# Patient Record
Sex: FEMALE | NOT HISPANIC OR LATINO | Employment: OTHER | URBAN - METROPOLITAN AREA
[De-identification: names, ages, dates, MRNs, and addresses within clinical notes are randomized per-mention and may not be internally consistent; named-entity substitution may affect disease eponyms.]

---

## 2017-04-03 ENCOUNTER — HOSPITAL ENCOUNTER (OUTPATIENT)
Dept: MAMMOGRAPHY | Facility: CLINIC | Age: 42
Discharge: HOME | End: 2017-04-03
Attending: OBSTETRICS & GYNECOLOGY | Admitting: OBSTETRICS & GYNECOLOGY
Payer: COMMERCIAL

## 2017-04-03 DIAGNOSIS — Z12.31 VISIT FOR SCREENING MAMMOGRAM: ICD-10-CM

## 2017-04-03 PROCEDURE — G0202 SCR MAMMO BI INCL CAD: HCPCS

## 2017-04-18 ENCOUNTER — OFFICE VISIT (OUTPATIENT)
Dept: FAMILY MEDICINE | Facility: CLINIC | Age: 42
End: 2017-04-18
Payer: COMMERCIAL

## 2017-04-18 VITALS
SYSTOLIC BLOOD PRESSURE: 128 MMHG | TEMPERATURE: 97.8 F | HEIGHT: 65 IN | OXYGEN SATURATION: 95 % | DIASTOLIC BLOOD PRESSURE: 84 MMHG | WEIGHT: 227 LBS | BODY MASS INDEX: 37.82 KG/M2 | HEART RATE: 80 BPM

## 2017-04-18 DIAGNOSIS — Z13.6 CARDIOVASCULAR SCREENING; LDL GOAL LESS THAN 160: ICD-10-CM

## 2017-04-18 DIAGNOSIS — R03.0 ELEVATED BLOOD PRESSURE READING WITHOUT DIAGNOSIS OF HYPERTENSION: Primary | ICD-10-CM

## 2017-04-18 DIAGNOSIS — E66.9 OBESITY (BMI 30-39.9): ICD-10-CM

## 2017-04-18 PROCEDURE — 99214 OFFICE O/P EST MOD 30 MIN: CPT | Performed by: PHYSICIAN ASSISTANT

## 2017-04-18 NOTE — PATIENT INSTRUCTIONS
So glad you're motivated to make life-style changes.  After BP log review do not feel meds required at this time.  Certainly still at risk for HTN development though in future should this not improve.  Discussed secondary causes including dietary salt and obesity.  Will screen for additional risk factors with lipids and diabetes.  RTC for fasting labs.  Would also advise consult with nutritionist for formal weight loss help.  Re-check in 3 months.    Electronically Signed By: Kristin Kumar PA-C

## 2017-04-18 NOTE — NURSING NOTE
"Chief Complaint   Patient presents with     Hypertension       Initial BP (!) 142/106 (BP Location: Right arm, Cuff Size: Adult Large)  Pulse 80  Temp 97.8  F (36.6  C) (Oral)  Ht 5' 5\" (1.651 m)  Wt 227 lb (103 kg)  SpO2 95%  Breastfeeding? No  BMI 37.77 kg/m2 Estimated body mass index is 37.77 kg/(m^2) as calculated from the following:    Height as of this encounter: 5' 5\" (1.651 m).    Weight as of this encounter: 227 lb (103 kg).  Medication Reconciliation: complete    "

## 2017-04-18 NOTE — PROGRESS NOTES
SUBJECTIVE:                                                    Jacinta Robertson is a 42 year old female who presents to clinic today for the following health issues:      Hypertension - Family history of heart related issues.  Mother  of heart attack in early 70s - she was a smoker and mitral valve problems.    - Patient was seen for her physical at her OB Clinic in [place] and her blood pressure has been consistently high there. Higher than typical of even what she had in the past.   Reports typically runs in 130-140's systolic. Was even higher that time and she impressed upon pt the importance of starting to manage her health/weight better and take care of her health.  Pt in agreement and wished to establish care in FP to ensure on-going follow-up regarding this.    Happy to report over the past 1 month and has been starting to make changes with this.  Started exercising M-F 30 min daily.   Decreasing sugar/processed carb intake. More protein. Remote hx of seeing a nutritionist, but does agree this would be beneficial again as she knows she struggles with portion control.   Motivated to make these changes and would like to avoid meds if she can, but wanted to be sure med management not required.    Bought home cuff and has been checking 2-3x daily and brings log for review. Of the total 78 readings only 24 were > 140/90.  Rare reading in 160s/100s - admits she didn't always rest appropriately before taking, but tried not to be active or avoid checking if sick or hurt.    Admits she is more anxious and knows this can play a role too.  Stressors recently with new home and recent move.   Non-smoker.  No other CV risk screening excluding her OB did check a TSH which she shows me today and this was normal.      Outpatient blood pressures are being checked at home for about a month 2-3 times a day  Results are .    Low Salt Diet: no added salt       Amount of exercise or physical activity: None    Problems taking  "medications regularly: isn't on a blood pressure medication currently    Medication side effects: none    Diet: has been cutting out carbs as well as sugars      Problem list and histories reviewed & adjusted, as indicated.  Additional history: as documented    Patient Active Problem List   Diagnosis     CARDIOVASCULAR SCREENING; LDL GOAL LESS THAN 160     Obesity (BMI 30-39.9)     Migraines     Past Surgical History:   Procedure Laterality Date     BUNIONECTOMY  2003    bilateral     C ORAL SURGERY PROCEDURE      wisdom teeth       Social History   Substance Use Topics     Smoking status: Never Smoker     Smokeless tobacco: Never Used     Alcohol use 0.0 oz/week     0 Standard drinks or equivalent per week     Family History   Problem Relation Age of Onset     Hypertension Mother       age 71     Lipids Father      Breast Cancer Paternal Aunt      Cousin as well     C.A.D. Mother          Current Outpatient Prescriptions   Medication Sig Dispense Refill     multivitamin, therapeutic with minerals (THERA-VIT-M) TABS        loratadine (CLARITIN) 10 MG tablet Take 10 mg by mouth       triamcinolone (NASACORT AQ) 55 MCG/ACT nasal inhaler Spray 2 sprays into both nostrils daily. 3 Inhaler 3     azelastine (OPTIVAR) 0.05 % SOLN Apply 1 drop to eye 2 times daily. 1 Bottle 3     No Known Allergies    Reviewed and updated as needed this visit by clinical staff  Tobacco  Allergies  Meds  Med Hx  Surg Hx  Fam Hx  Soc Hx      Reviewed and updated as needed this visit by Provider  Tobacco  Allergies  Meds  Med Hx  Surg Hx  Fam Hx  Soc Hx        ROS:  Constitutional, HEENT, cardiovascular, pulmonary, gi and gu systems are negative, except as otherwise noted.    OBJECTIVE:                                                    /84  Pulse 80  Temp 97.8  F (36.6  C) (Oral)  Ht 5' 5\" (1.651 m)  Wt 227 lb (103 kg)  SpO2 95%  Breastfeeding? No  BMI 37.77 kg/m2  Body mass index is 37.77 kg/(m^2).   Pt home " BP cuff checked against ours in clinic and found to be within 2-3mmHg.  GENERAL: pleasant obese female. NAD.  Psych: affect bright, engaged. Normal speech, thought content and mentation.  EYES: Eyes grossly normal to inspection, PERRL and conjunctivae and sclerae normal  NECK: no adenopathy, no asymmetry, masses, or scars and thyroid normal to palpation  RESP: lungs clear to auscultation - no rales, rhonchi or wheezes  CV: regular rate and rhythm, normal S1 S2, no S3 or S4, no murmur, click or rub, no peripheral edema and peripheral pulses strong    Diagnostic Test Results:  none      ASSESSMENT/PLAN:                                                          ICD-10-CM    1. Elevated blood pressure reading without diagnosis of hypertension R03.0 Comprehensive metabolic panel (BMP + Alb, Alk Phos, ALT, AST, Total. Bili, TP)     NUTRITION REFERRAL   2. Obesity (BMI 30-39.9) E66.9 NUTRITION REFERRAL   3. CARDIOVASCULAR SCREENING; LDL GOAL LESS THAN 160 Z13.6 Lipid Profile with reflex to direct LDL   Reviewed with pt that suspect her BP elevation is primarily secondary due to poor dietary habits and obesity.  Pt in agreement and very motivated to make changes.  To her credit she has already started weight loss routine and has been checking pressures at home on own.  We reviewed appropriate way to check this moving forward for continue monitoring as she continues life-style changes.   Advised RTC for further CV risk screening and would likely benefit from continued support with nutritionist.  New referral placed.  Re-check weight and BP log review again in 3 months.  Pt in agreement with plan.  See Patient Instructions  A total of 30 minutes was spent with the patient today, with greater than 50% of the visit involving counseling and coordination of care regarding potential risks of untreated HTN, possible underlying causes of HTN and next best treatment plan as noted above and in pt instructions.  If persistent BP  elevation despite weight normalization would consider additional work-up with EKG, CXR, sleep study if indicated.  Patient Instructions   So glad you're motivated to make life-style changes.  After BP log review do not feel meds required at this time.  Certainly still at risk for HTN development though in future should this not improve.  Discussed secondary causes including dietary salt and obesity.  Will screen for additional risk factors with lipids and diabetes.  RTC for fasting labs.  Would also advise consult with nutritionist for formal weight loss help.  Re-check in 3 months.    Electronically Signed By: Kristin Kumar PA-C

## 2017-04-18 NOTE — MR AVS SNAPSHOT
After Visit Summary   4/18/2017    Jacinta Robertson    MRN: [id number]           Patient Information     Date Of Birth          1975        Visit Information        Provider Department      4/18/2017 11:20 AM Kristin Kumar PA-C [facility]        Today's Diagnoses     Elevated blood pressure reading without diagnosis of hypertension    -  1    Obesity (BMI 30-39.9)        CARDIOVASCULAR SCREENING; LDL GOAL LESS THAN 160          Care Instructions    So glad you're motivated to make life-style changes.  After BP log review do not feel meds required at this time.  Certainly still at risk for HTN development though in future should this not improve.  Discussed secondary causes including dietary salt and obesity.  Will screen for additional risk factors with lipids and diabetes.  RTC for fasting labs.  Would also advise consult with nutritionist for formal weight loss help.  Re-check in 3 months.    Electronically Signed By: Kristin Kumar PA-C          Follow-ups after your visit        Additional Services     NUTRITION REFERRAL       Your provider has referred you to: [facility]: [facility] [phone]   [email or web address]  [facility]: [facility] [phone]   [email or web address]  [facility]: [place] [facility] [phone]   [email or web address]  [facility]: [facility] [place] [phone]   [email or web address]    Please be aware that coverage of these services is subject to the terms and limitations of your health insurance plan.  Call member services at your health plan with any benefit or coverage questions.      Please bring the following with you to your appointment:    (1) This referral request  (2) Any documents given to you regarding this referral  (3) Any specific questions you have about diet  "and/or food choices                  Future tests that were ordered for you today     Open Future Orders        Priority Expected Expires Ordered    Lipid Profile with reflex to direct LDL Routine  5/18/2017 4/18/2017    Comprehensive metabolic panel (BMP + Alb, Alk Phos, ALT, AST, Total. Bili, TP) Routine  5/18/2017 4/18/2017            Who to contact     If you have questions or need follow up information about today's clinic visit or your schedule please contact [facility] directly at [phone].  Normal or non-critical lab and imaging results will be communicated to you by [employer], letter or phone within 4 business days after the clinic has received the results. If you do not hear from us within 7 days, please contact the clinic through [employer] or phone. If you have a critical or abnormal lab result, we will notify you by phone as soon as possible.  Submit refill requests through [facility] or call your pharmacy and they will forward the refill request to us. Please allow 3 business days for your refill to be completed.          Additional Information About Your Visit        [employer] Information     [facility] gives you secure access to your electronic health record. If you see a primary care provider, you can also send messages to your care team and make appointments. If you have questions, please call your primary care clinic.  If you do not have a primary care provider, please call [phone] and they will assist you.        Care EveryWhere ID     This is your Care EveryWhere ID. This could be used by other organizations to access your [facility] medical records  [id number]        Your Vitals Were     Pulse Temperature Height Pulse Oximetry Breastfeeding? BMI (Body Mass Index)    80 97.8  F (36.6  C) (Oral) 5' 5\" (1.651 m) 95% No 37.77 kg/m2       Blood Pressure from Last 3 Encounters:   04/18/17 128/84   01/12/16 118/80   04/17/14 112/80    Weight from Last 3 Encounters:   04/18/17 227 lb (103 kg) "   01/12/16 212 lb (96.2 kg)   04/17/14 212 lb (96.2 kg)              We Performed the Following     NUTRITION REFERRAL        Primary Care Provider Office Phone # Fax #    Kristin Kumar PA-C [phone]       [facility] [address] [facility] [address]        Thank you!     Thank you for choosing [facility]  for your care. Our goal is always to provide you with excellent care. Hearing back from our patients is one way we can continue to improve our services. Please take a few minutes to complete the written survey that you may receive in the mail after your visit with us. Thank you!             Your Updated Medication List - Protect others around you: Learn how to safely use, store and throw away your medicines at www.disposemymeds.org.          This list is accurate as of: 4/18/17 12:18 PM.  Always use your most recent med list.                   Brand Name Dispense Instructions for use    azelastine 0.05 % Soln ophthalmic solution    OPTIVAR    1 Bottle    Apply 1 drop to eye 2 times daily.       loratadine 10 MG tablet    CLARITIN     Take 10 mg by mouth       multivitamin, therapeutic with minerals Tabs tablet          triamcinolone 55 MCG/ACT nasal inhaler    NASACORT AQ    3 Inhaler    Spray 2 sprays into both nostrils daily.

## 2017-04-22 DIAGNOSIS — R03.0 ELEVATED BLOOD PRESSURE READING WITHOUT DIAGNOSIS OF HYPERTENSION: ICD-10-CM

## 2017-04-22 DIAGNOSIS — Z13.6 CARDIOVASCULAR SCREENING; LDL GOAL LESS THAN 160: ICD-10-CM

## 2017-04-22 LAB
ALBUMIN SERPL-MCNC: 3.7 G/DL (ref 3.4–5)
ALP SERPL-CCNC: 77 U/L (ref 40–150)
ALT SERPL W P-5'-P-CCNC: 37 U/L (ref 0–50)
ANION GAP SERPL CALCULATED.3IONS-SCNC: 9 MMOL/L (ref 3–14)
AST SERPL W P-5'-P-CCNC: 18 U/L (ref 0–45)
BILIRUB SERPL-MCNC: 0.6 MG/DL (ref 0.2–1.3)
BUN SERPL-MCNC: 13 MG/DL (ref 7–30)
CALCIUM SERPL-MCNC: 8.9 MG/DL (ref 8.5–10.1)
CHLORIDE SERPL-SCNC: 105 MMOL/L (ref 94–109)
CHOLEST SERPL-MCNC: 209 MG/DL
CO2 SERPL-SCNC: 26 MMOL/L (ref 20–32)
CREAT SERPL-MCNC: 0.74 MG/DL (ref 0.52–1.04)
GFR SERPL CREATININE-BSD FRML MDRD: 85 ML/MIN/1.7M2
GLUCOSE SERPL-MCNC: 85 MG/DL (ref 70–99)
HDLC SERPL-MCNC: 45 MG/DL
LDLC SERPL CALC-MCNC: 139 MG/DL
NONHDLC SERPL-MCNC: 164 MG/DL
POTASSIUM SERPL-SCNC: 4.3 MMOL/L (ref 3.4–5.3)
PROT SERPL-MCNC: 7.8 G/DL (ref 6.8–8.8)
SODIUM SERPL-SCNC: 140 MMOL/L (ref 133–144)
TRIGL SERPL-MCNC: 124 MG/DL

## 2017-04-22 PROCEDURE — 36415 COLL VENOUS BLD VENIPUNCTURE: CPT | Performed by: PHYSICIAN ASSISTANT

## 2017-04-22 PROCEDURE — 80061 LIPID PANEL: CPT | Performed by: PHYSICIAN ASSISTANT

## 2017-04-22 PROCEDURE — 80053 COMPREHEN METABOLIC PANEL: CPT | Performed by: PHYSICIAN ASSISTANT

## 2017-04-22 NOTE — LETTER
[facility]          [address] [address]                                           [phone]  April 28, 2017     Jacinta Robertson  [address]  [place] [address]      Dear Jacinta,    The results of your recent tests were reviewed and are as follows:    -Liver and gallbladder tests are normal. (ALT,AST, Alk phos, bilirubin), kidney function is normal (Cr, GFR), Sodium is normal, Potassium is normal, Calcium is normal, Glucose is normal (diabetes screening test).   -LDL(bad) cholesterol is elevated and trigylceride levels are normal.   -HDL(good) cholesterol level is low which can increase your heart disease risk.  A diet high in fat and simple carbohydrates, genetics and being overweight can contribute to this.   ADVISE: a regular exercise program with at least 30 minutes of aerobic exercise 3-4 days/week ( 45 minutes 4-6 days/week if weight loss needed) are helpful to improve this. Continue your life-style changes as planned! Rechecking your cholesterol in 6-12 months is recommended (LIPID w/ LDL reflex, DX: low HDL).     Enclosed is a copy of the results.     Thank you for choosing [facility].  We appreciate the opportunity to serve you and look forward to supporting your healthcare needs in the future.    If you have any questions or concerns, please call me or my staff at [phone].      Sincerely,    MILADYS Rice

## 2017-04-28 NOTE — PROGRESS NOTES
Please call or write patient with the following results:    -Liver and gallbladder tests are normal. (ALT,AST, Alk phos, bilirubin), kidney function is normal (Cr, GFR), Sodium is normal, Potassium is normal, Calcium is normal, Glucose is normal (diabetes screening test).   -LDL(bad) cholesterol is elevated and trigylceride levels are normal.  -HDL(good) cholesterol level is low which can increase your heart disease risk.  A diet high in fat and simple carbohydrates, genetics and being overweight can contribute to this.   ADVISE: a regular exercise program with at least 30 minutes of aerobic exercise 3-4 days/week ( 45 minutes 4-6 days/week if weight loss needed) are helpful to improve this. Continue your life-style changes as planned! Rechecking your cholesterol in 6-12 months is recommended (LIPID w/ LDL reflex, DX: low HDL).    Electronically Signed By: Kristin Kumar PA-C

## 2017-07-03 ENCOUNTER — OFFICE VISIT (OUTPATIENT)
Dept: FAMILY MEDICINE | Facility: CLINIC | Age: 42
End: 2017-07-03
Payer: COMMERCIAL

## 2017-07-03 VITALS
HEART RATE: 83 BPM | DIASTOLIC BLOOD PRESSURE: 85 MMHG | WEIGHT: 225 LBS | OXYGEN SATURATION: 98 % | HEIGHT: 65 IN | BODY MASS INDEX: 37.49 KG/M2 | SYSTOLIC BLOOD PRESSURE: 124 MMHG | TEMPERATURE: 98.5 F

## 2017-07-03 DIAGNOSIS — E66.9 OBESITY (BMI 30-39.9): ICD-10-CM

## 2017-07-03 DIAGNOSIS — R03.0 ELEVATED BP WITHOUT DIAGNOSIS OF HYPERTENSION: Primary | ICD-10-CM

## 2017-07-03 PROCEDURE — 99213 OFFICE O/P EST LOW 20 MIN: CPT | Performed by: PHYSICIAN ASSISTANT

## 2017-07-03 NOTE — PROGRESS NOTES
"  SUBJECTIVE:                                                    Jacinta Robertson is a 42 year old female who presents to clinic today for the following health issues:      Hypertension Follow-up; see prior note for details. Has made significant strides with making changes in exercise and diet.   Had one hiccup where she had a head cold mid May so this put her back a bit as took her about 3 weeks to be feeling well again.   Otherwise had been exercising 30 min 5x per day.  Now since feeling better and with busy summer plans is doing between 3-5x per week.  Walks dog and doing a lot of yard work with more physical activity this way too.   See nutritionist at [facility].  Given recommendations regarding limiting sugar and \"healing my gut\" had a lot of abx as a child for ear infections.  They reviewed with her gut inflammation and how this contributes to immune health.   Taking probiotic before meals.   Trying to have \"healthy fats\" and more vegetables.   Still has sugar at moments, but not \"out of control\" like she was previously.   Has been slow changes, but steady.   Has been noticing she is less bloated and overall feels better.  Focusing on life-style changes.  Meeting at end of the month again.   Had suffered with plantar fasciitis and notice that even things like this even bother her less with making good changes.       Brings copy of BP log with 9 readings.   Only 3 out of 9 are elevated in diastolic range at most of 99.  All systolic readings are normal.       Outpatient blood pressures are being checked at home.  Results are .    Low Salt Diet: no added salt      Amount of exercise or physical activity: 3-5 days a week    Problems taking medications regularly: No    Medication side effects: none    Diet: regular (no restrictions) - seeing a nutritionists and is eating no processed foods        Problem list and histories reviewed & adjusted, as indicated.  Additional history: as " "documented    Patient Active Problem List   Diagnosis     CARDIOVASCULAR SCREENING; LDL GOAL LESS THAN 160     Obesity (BMI 30-39.9)     Migraines     Past Surgical History:   Procedure Laterality Date     BUNIONECTOMY  2003    bilateral     C ORAL SURGERY PROCEDURE      wisdom teeth     ORTHOPEDIC SURGERY  2003    bilateral bunionectomy       Social History   Substance Use Topics     Smoking status: Never Smoker     Smokeless tobacco: Never Used     Alcohol use 0.0 oz/week     Family History   Problem Relation Age of Onset     Hypertension Mother       age 71     C.A.D. Mother      Lipids Father      Hyperlipidemia Father      Breast Cancer Paternal Aunt      Cousin as well         Current Outpatient Prescriptions   Medication Sig Dispense Refill     multivitamin, therapeutic with minerals (THERA-VIT-M) TABS        loratadine (CLARITIN) 10 MG tablet Take 10 mg by mouth       triamcinolone (NASACORT AQ) 55 MCG/ACT nasal inhaler Spray 2 sprays into both nostrils daily. 3 Inhaler 3     azelastine (OPTIVAR) 0.05 % SOLN Apply 1 drop to eye 2 times daily. 1 Bottle 3     No Known Allergies    Reviewed and updated as needed this visit by clinical staff       Reviewed and updated as needed this visit by Provider           ROS:  Constitutional, HEENT, cardiovascular, pulmonary systems are negative, except as otherwise noted.    OBJECTIVE:     /85  Pulse 83  Temp 98.5  F (36.9  C) (Oral)  Ht 5' 5\" (1.651 m)  Wt 225 lb (102.1 kg)  SpO2 98%  Breastfeeding? No  BMI 37.44 kg/m2  Body mass index is 37.44 kg/(m^2).  GENERAL: healthy, alert and no distress  No further exam completed    Diagnostic Test Results:  none     ASSESSMENT/PLAN:       ICD-10-CM    1. Elevated BP without diagnosis of hypertension R03.0    2. Obesity (BMI 30-39.9) E66.9    All readings improved from previous.  Do not feel that medication for HTN warranted at this time as pt loosing weight, making health life-style changes and overall has " had BP improvement.  Will continue efforts and we'll check in again in 6 months.  Pt to see her OB/GYN for routine female health as well.  See Patient Instructions  Patient Instructions   Great work! Keep it up!  BP's continue to show improvement.  Re-check in 6 months unless sooner if noticing persistent BP increasing.    Electronically Signed By: Kristin Kumar PA-C

## 2017-07-03 NOTE — NURSING NOTE
"Chief Complaint   Patient presents with     Hypertension       Initial /85  Pulse 83  Temp 98.5  F (36.9  C) (Oral)  Ht 5' 5\" (1.651 m)  Wt 225 lb (102.1 kg)  SpO2 98%  Breastfeeding? No  BMI 37.44 kg/m2 Estimated body mass index is 37.44 kg/(m^2) as calculated from the following:    Height as of this encounter: 5' 5\" (1.651 m).    Weight as of this encounter: 225 lb (102.1 kg).  Medication Reconciliation: complete    "

## 2017-07-03 NOTE — PATIENT INSTRUCTIONS
Great work! Keep it up!  BP's continue to show improvement.  Re-check in 6 months unless sooner if noticing persistent BP increasing.    Electronically Signed By: Kristin Kumar PA-C

## 2017-07-03 NOTE — MR AVS SNAPSHOT
After Visit Summary   7/3/2017    Jacinta Robertson    MRN: [id number]           Patient Information     Date Of Birth          1975        Visit Information        Provider Department      7/3/2017 11:00 AM Kristin Kumar PA-C [facility] [place]        Today's Diagnoses     Elevated BP without diagnosis of hypertension    -  1    Obesity (BMI 30-39.9)          Care Instructions    Great work! Keep it up!  BP's continue to show improvement.  Re-check in 6 months unless sooner if noticing persistent BP increasing.    Electronically Signed By: Kristin Kumar PA-C            Follow-ups after your visit        Who to contact     If you have questions or need follow up information about today's clinic visit or your schedule please contact [facility] [place] directly at [phone].  Normal or non-critical lab and imaging results will be communicated to you by TrackIFhart, letter or phone within 4 business days after the clinic has received the results. If you do not hear from us within 7 days, please contact the clinic through TrackIFhart or phone. If you have a critical or abnormal lab result, we will notify you by phone as soon as possible.  Submit refill requests through Sketchfab or call your pharmacy and they will forward the refill request to us. Please allow 3 business days for your refill to be completed.          Additional Information About Your Visit        MyChart Information     Sketchfab gives you secure access to your electronic health record. If you see a primary care provider, you can also send messages to your care team and make appointments. If you have questions, please call your primary care clinic.  If you do not have a primary care provider, please call [phone] and they will assist you.        Care EveryWhere ID     This is your Care EveryWhere ID. This could be used by other organizations to access your [place] medical records  [id number]        Your  "Vitals Were     Pulse Temperature Height Pulse Oximetry Breastfeeding? BMI (Body Mass Index)    83 98.5  F (36.9  C) (Oral) 5' 5\" (1.651 m) 98% No 37.44 kg/m2       Blood Pressure from Last 3 Encounters:   07/03/17 124/85   04/18/17 128/84   01/12/16 118/80    Weight from Last 3 Encounters:   07/03/17 225 lb (102.1 kg)   04/18/17 227 lb (103 kg)   01/12/16 212 lb (96.2 kg)              Today, you had the following     No orders found for display       Primary Care Provider Office Phone # Fax #    Kristin Kumar PA-C [phone]       [facility] [address] [facility] [address]        Equal Access to Services     HUBER ATKINS : Hadii aad ku hadasho Soomaali, waaxda luqadaha, qaybta kaalmada adeegyada, patria sweeneyin hayaan marian belle . So [facility] [phone].    ATENCIÓN: Si habla español, tiene a kasper disposición servicios gratuitos de asistencia lingüística. Cheyanne al [phone].    We comply with applicable federal civil rights laws and [place] laws. We do not discriminate on the basis of race, color, national origin, age, disability sex, sexual orientation or gender identity.            Thank you!     Thank you for choosing [facility]  for your care. Our goal is always to provide you with excellent care. Hearing back from our patients is one way we can continue to improve our services. Please take a few minutes to complete the written survey that you may receive in the mail after your visit with us. Thank you!             Your Updated Medication List - Protect others around you: Learn how to safely use, store and throw away your medicines at www.disposemymeds.org.          This list is accurate as of: 7/3/17 11:44 AM.  Always use your most recent med list.                   Brand Name Dispense Instructions for use Diagnosis    azelastine 0.05 % Soln ophthalmic solution    OPTIVAR    1 Bottle    Apply 1 drop to eye 2 times daily.    Seasonal allergies    "    loratadine 10 MG tablet    CLARITIN     Take 10 mg by mouth        multivitamin, therapeutic with minerals Tabs tablet           triamcinolone 55 MCG/ACT nasal inhaler    NASACORT AQ    3 Inhaler    Spray 2 sprays into both nostrils daily.    Seasonal allergies

## 2017-09-16 ENCOUNTER — NURSE TRIAGE (OUTPATIENT)
Dept: NURSING | Facility: CLINIC | Age: 42
End: 2017-09-16

## 2017-09-16 ENCOUNTER — OFFICE VISIT (OUTPATIENT)
Dept: URGENT CARE | Facility: URGENT CARE | Age: 42
End: 2017-09-16
Payer: COMMERCIAL

## 2017-09-16 VITALS
SYSTOLIC BLOOD PRESSURE: 135 MMHG | DIASTOLIC BLOOD PRESSURE: 87 MMHG | HEART RATE: 79 BPM | OXYGEN SATURATION: 96 % | TEMPERATURE: 98.7 F

## 2017-09-16 DIAGNOSIS — L03.114 CELLULITIS OF LEFT UPPER EXTREMITY: Primary | ICD-10-CM

## 2017-09-16 PROCEDURE — 99213 OFFICE O/P EST LOW 20 MIN: CPT | Performed by: INTERNAL MEDICINE

## 2017-09-16 RX ORDER — SULFAMETHOXAZOLE/TRIMETHOPRIM 800-160 MG
1 TABLET ORAL 2 TIMES DAILY
Qty: 20 TABLET | Refills: 0 | Status: SHIPPED | OUTPATIENT
Start: 2017-09-16 | End: 2018-12-31

## 2017-09-16 NOTE — TELEPHONE ENCOUNTER
"Slept on pillow with zipper to elbow.  Awoke Thursday with elbow red, \"hot\" and swollen.  Temp had been \"102\" which has now resolved to \"99\" at last check.  Not worsening, but area not improving.        Reason for Disposition    [1] Redness of the skin AND [2] no fever    Protocols used: ELBOW SWELLING-ADULT-AH    "

## 2017-09-16 NOTE — PROGRESS NOTES
SUBJECTIVE:  Jacinta Robertson is an 42 year old female who presents for swelling and redness of left elbow.  Woke up two days ago and noticed pain in the elbow.  Felt a little swelling and tenderness at that time.  Then later in day started to feel feverish.  Temp was 100.2 when checked it.  Then yesterday the redness seemed to be spreading.  No fever yesterday and felt more normal overall. Today seems be spreading more and is tender to touch and warm to touch.  No fever today.  No bites, stings or scratches she's aware of in that area.  No n/v/d.  No cough or runny nose.  No recent intl travel but  recently was in [place] and is feeling fine.   No known exposures.  Did take an OTC zinc med and applied a cream to the affected area.         has a past medical history of major depression; Hypertension; SHAILA (obstructive sleep apnea); and Seasonal allergies.  ALLERGIES:  Review of patient's allergies indicates no known allergies.    Current Outpatient Prescriptions   Medication     UNABLE TO FIND     UNABLE TO FIND     Docosahexaenoic Acid (DHA PO)     Cholecalciferol (VITAMIN D-3 PO)     Borage, Borago officinalis, (BORAGE OIL PO)     Probiotic Product (PROBIOTIC DAILY PO)     loratadine (CLARITIN) 10 MG tablet     triamcinolone (NASACORT AQ) 55 MCG/ACT nasal inhaler     multivitamin, therapeutic with minerals (THERA-VIT-M) TABS     azelastine (OPTIVAR) 0.05 % SOLN     No current facility-administered medications for this visit.          ROS:  ROS is done and is negative for general/constitutional, eye, ENT, Respiratory, cardiovascular, GI, , Skin, musculoskeletal except as noted elsewhere.      OBJECTIVE:  /87 (BP Location: Right arm, Cuff Size: Adult Large)  Pulse 79  Temp 98.7  F (37.1  C) (Oral)  SpO2 96%  Breastfeeding? No  GENERAL APPEARANCE: Alert, in no acute distress  EYES: normal  NOSE: normal  OROPHARYNX:normal  NECK:No adenopathy,masses or thyromegaly  RESP: normal and clear to  auscultation  CV:regular rate and rhythm and no murmurs, clicks, or gallops  ABDOMEN: Abdomen soft, non-tender. BS normal. No masses, organomegaly  Left arm: posterior elbow region with mild diffuse edema covering area approx 6x7 inches with area just superior to elbow with moderate edema.  Area is moderately erythematous, very tender to touch, and moderately warm to touch.   No pain with rom.      RECENT LAB RESULTS  .    ASSESSMENT/PLAN:    ASSESSMENT / PLAN:  (L03.114) Cellulitis of left upper extremity  (primary encounter diagnosis)  Comment:   Plan: sulfamethoxazole-trimethoprim (BACTRIM         DS/SEPTRA DS) 800-160 MG per tablet        Reviewed medication instructions and side effects. Follow up if experiences side effects.. I reviewed supportive care, expected course, and signs of concern.  Follow up prn or if she does not improve or if worsens in any way.      See [facility] for orders, medications, letters, patient instructions    Annette Garcia M.D.

## 2017-09-16 NOTE — MR AVS SNAPSHOT
After Visit Summary   9/16/2017    Jacinta Robertson    MRN: [id number]           Patient Information     Date Of Birth          1975        Visit Information        Provider Department      9/16/2017 10:20 AM Annette Garcia MD [facility]        Today's Diagnoses     Cellulitis of left upper extremity    -  1       Follow-ups after your visit        Follow-up notes from your care team     Return if symptoms worsen or fail to improve.      Who to contact     If you have questions or need follow up information about today's clinic visit or your schedule please contact [facility] directly at [phone].  Normal or non-critical lab and imaging results will be communicated to you by MyChart, letter or phone within 4 business days after the clinic has received the results. If you do not hear from us within 7 days, please contact the clinic through 5th Planet Gameshart or phone. If you have a critical or abnormal lab result, we will notify you by phone as soon as possible.  Submit refill requests through Micropoint Technologies or call your pharmacy and they will forward the refill request to us. Please allow 3 business days for your refill to be completed.          Additional Information About Your Visit        MyChart Information     [employer] gives you secure access to your electronic health record. If you see a primary care provider, you can also send messages to your care team and make appointments. If you have questions, please call your primary care clinic.  If you do not have a primary care provider, please call [phone] and they will assist you.        Care EveryWhere ID     This is your Care EveryWhere ID. This could be used by other organizations to access your [place] medical records  [id number]        Your Vitals Were     Pulse Temperature Pulse Oximetry Breastfeeding?          79 98.7  F (37.1  C) (Oral) 96% No         Blood Pressure from Last 3 Encounters:    09/16/17 135/87   07/03/17 124/85   04/18/17 128/84    Weight from Last 3 Encounters:   07/03/17 225 lb (102.1 kg)   04/18/17 227 lb (103 kg)   01/12/16 212 lb (96.2 kg)              Today, you had the following     No orders found for display         Today's Medication Changes          These changes are accurate as of: 9/16/17 10:50 AM.  If you have any questions, ask your nurse or doctor.               Start taking these medicines.        Dose/Directions    sulfamethoxazole-trimethoprim 800-160 MG per tablet   Commonly known as:  BACTRIM DS/SEPTRA DS   Used for:  Cellulitis of left upper extremity   Started by:  Annette Garcia MD        Dose:  1 tablet   Take 1 tablet by mouth 2 times daily   Quantity:  20 tablet   Refills:  0            Where to get your medicines      These medications were sent to [facility] [address] [place] [address] AT [facility] [address] [address], [facility] [address]    Hours:  24-hours Phone:  [phone]     sulfamethoxazole-trimethoprim 800-160 MG per tablet                Primary Care Provider Office Phone # Fax #    Kristin Kumar PA-C [phone] [address] [facility] [address]        Equal Access to Services     [facility] AH: Hadii aad ku hadasho Soomaali, waaxda luqadaha, qaybta kaalmada adeegyada, patria cabello. So [facility] [phone].    ATENCIÓN: Si habla español, tiene a kasper disposición servicios gratuitos de asistencia lingüística. Cheyanne al [phone].    We comply with applicable federal civil rights laws and [place] laws. We do not discriminate on the basis of race, color, national origin, age, disability sex, sexual orientation or gender identity.            Thank you!     Thank you for choosing [facility]  for your care. Our goal is always to provide you with excellent care. Hearing back from our patients is one way we can continue  to improve our services. Please take a few minutes to complete the written survey that you may receive in the mail after your visit with us. Thank you!             Your Updated Medication List - Protect others around you: Learn how to safely use, store and throw away your medicines at www.disposemymeds.org.          This list is accurate as of: 9/16/17 10:50 AM.  Always use your most recent med list.                   Brand Name Dispense Instructions for use Diagnosis    azelastine 0.05 % Soln ophthalmic solution    OPTIVAR    1 Bottle    Apply 1 drop to eye 2 times daily.    Seasonal allergies       BORAGE OIL PO           DHA PO           loratadine 10 MG tablet    CLARITIN     Take 10 mg by mouth        multivitamin, therapeutic with minerals Tabs tablet           PROBIOTIC DAILY PO           sulfamethoxazole-trimethoprim 800-160 MG per tablet    BACTRIM DS/SEPTRA DS    20 tablet    Take 1 tablet by mouth 2 times daily    Cellulitis of left upper extremity       triamcinolone 55 MCG/ACT nasal inhaler    NASACORT AQ    3 Inhaler    Spray 2 sprays into both nostrils daily.    Seasonal allergies       * UNABLE TO FIND      MEDICATION NAME: OTC allergy eye drop        * UNABLE TO FIND      MEDICATION NAME: Grape fruit seed extract        VITAMIN D-3 PO           * Notice:  This list has 2 medication(s) that are the same as other medications prescribed for you. Read the directions carefully, and ask your doctor or other care provider to review them with you.

## 2017-09-16 NOTE — NURSING NOTE
"Chief Complaint   Patient presents with     Urgent Care     Elbow left     Left elbow pain started Thursday morning--patient is unsure why, patient state that there has been no injury. Elbow has been swollen and hot to the touch--may have been a bug bite but no itching.        Initial /87 (BP Location: Right arm, Cuff Size: Adult Large)  Pulse 79  Temp 98.7  F (37.1  C) (Oral)  SpO2 96%  Breastfeeding? No Estimated body mass index is 37.44 kg/(m^2) as calculated from the following:    Height as of 7/3/17: 5' 5\" (1.651 m).    Weight as of 7/3/17: 225 lb (102.1 kg).  Medication Reconciliation: complete.  JANAE Padgett      "

## 2017-09-26 ENCOUNTER — OFFICE VISIT (OUTPATIENT)
Dept: FAMILY MEDICINE | Facility: CLINIC | Age: 42
End: 2017-09-26
Payer: COMMERCIAL

## 2017-09-26 ENCOUNTER — TELEPHONE (OUTPATIENT)
Dept: FAMILY MEDICINE | Facility: CLINIC | Age: 42
End: 2017-09-26

## 2017-09-26 VITALS
WEIGHT: 218 LBS | HEIGHT: 65 IN | SYSTOLIC BLOOD PRESSURE: 124 MMHG | OXYGEN SATURATION: 98 % | TEMPERATURE: 98.5 F | DIASTOLIC BLOOD PRESSURE: 78 MMHG | BODY MASS INDEX: 36.32 KG/M2 | HEART RATE: 93 BPM

## 2017-09-26 DIAGNOSIS — L03.114 CELLULITIS OF LEFT ELBOW: Primary | ICD-10-CM

## 2017-09-26 PROCEDURE — 99213 OFFICE O/P EST LOW 20 MIN: CPT | Performed by: PHYSICIAN ASSISTANT

## 2017-09-26 NOTE — MR AVS SNAPSHOT
"              After Visit Summary   9/26/2017    Jacinta Robertson    MRN: [id number]           Patient Information     Date Of Birth          1975        Visit Information        Provider Department      9/26/2017 11:20 AM Calli Miguel PA-C [facility] [place]        Today's Diagnoses     Cellulitis of left elbow    -  1       Follow-ups after your visit        Who to contact     If you have questions or need follow up information about today's clinic visit or your schedule please contact [facility] [place] directly at [phone].  Normal or non-critical lab and imaging results will be communicated to you by Autopilot (formerly Bislr)hart, letter or phone within 4 business days after the clinic has received the results. If you do not hear from us within 7 days, please contact the clinic through [email or web address] or phone. If you have a critical or abnormal lab result, we will notify you by phone as soon as possible.  Submit refill requests through 4Cable TV or call your pharmacy and they will forward the refill request to us. Please allow 3 business days for your refill to be completed.          Additional Information About Your Visit        MyChart Information     4Cable TV gives you secure access to your electronic health record. If you see a primary care provider, you can also send messages to your care team and make appointments. If you have questions, please call your primary care clinic.  If you do not have a primary care provider, please call [phone] and they will assist you.        Care EveryWhere ID     This is your Care EveryWhere ID. This could be used by other organizations to access your [place] medical records  [id number]        Your Vitals Were     Pulse Temperature Height Pulse Oximetry BMI (Body Mass Index)       93 98.5  F (36.9  C) (Oral) 5' 5\" (1.651 m) 98% 36.28 kg/m2        Blood Pressure from Last 3 Encounters:   09/26/17 124/78   09/16/17 135/87   07/03/17 124/85    Weight from Last 3 Encounters: "   09/26/17 218 lb (98.9 kg)   07/03/17 225 lb (102.1 kg)   04/18/17 227 lb (103 kg)              Today, you had the following     No orders found for display       Primary Care Provider Office Phone # Fax #    Kristin Kumar PA-C [phone] [address] [facility] [address]        Equal Access to Services     FRAN ATKINS : Hadii aad ku hadasho Soomaali, waaxda luqadaha, qaybta kaalmada adeegyada, waxay idiin hayaan adeeg khtawnyash lajalen . So [facility] [phone].    ATENCIÓN: Si harjeetla espchacho, tiene a kasper disposición servicios gratuitos de asistencia lingüística. Teaame al [phone].    We comply with applicable federal civil rights laws and [place] laws. We do not discriminate on the basis of race, color, national origin, age, disability sex, sexual orientation or gender identity.            Thank you!     Thank you for choosing [facility]  for your care. Our goal is always to provide you with excellent care. Hearing back from our patients is one way we can continue to improve our services. Please take a few minutes to complete the written survey that you may receive in the mail after your visit with us. Thank you!             Your Updated Medication List - Protect others around you: Learn how to safely use, store and throw away your medicines at www.disposemymeds.org.          This list is accurate as of: 9/26/17  1:40 PM.  Always use your most recent med list.                   Brand Name Dispense Instructions for use Diagnosis    azelastine 0.05 % Soln ophthalmic solution    OPTIVAR    1 Bottle    Apply 1 drop to eye 2 times daily.    Seasonal allergies       BORAGE OIL PO           DHA PO           loratadine 10 MG tablet    CLARITIN     Take 10 mg by mouth        multivitamin, therapeutic with minerals Tabs tablet           PROBIOTIC DAILY PO           sulfamethoxazole-trimethoprim 800-160 MG per tablet    BACTRIM DS/SEPTRA DS    20 tablet    Take 1 tablet by mouth  2 times daily    Cellulitis of left upper extremity       triamcinolone 55 MCG/ACT nasal inhaler    NASACORT AQ    3 Inhaler    Spray 2 sprays into both nostrils daily.    Seasonal allergies       * UNABLE TO FIND      MEDICATION NAME: OTC allergy eye drop        * UNABLE TO FIND      MEDICATION NAME: Grape fruit seed extract        VITAMIN D-3 PO           * Notice:  This list has 2 medication(s) that are the same as other medications prescribed for you. Read the directions carefully, and ask your doctor or other care provider to review them with you.

## 2017-09-26 NOTE — TELEPHONE ENCOUNTER
Patient calling. She was seen for cellulitis of the elbow was placed on antibiotics and completed Bactrim this morning.     She states that she still has a little bump on the elbow, with a little bit of tenderness and appears bruised. She is wondering if she needs a longer course of antibiotics, or if she needs to be seen again.    Advised that we should see her for follow up. Appointment scheduled with JUDITH TOMAS for today at 11:20am.    Patient verbalized understanding and agrees with plan.    Will call back if further questions or concerns.    Tabby Rodriguez, RN, BSN

## 2017-09-26 NOTE — PROGRESS NOTES
SUBJECTIVE:   Jacinta Robertson is a 42 year old female who presents to clinic today for the following health issues:    ED/UC Followup:    Facility:  [facility]  Date of visit: 17  Reason for visit: Cellulitis of Left Elbow  Current Status: Pt took her last Bactrim today that was prescribed at , still has a bump is not sure if it needs to be drained or needs to try a different antibiotic.  The swelling has gone down but only in certain spots it is  but not painful like it was before.      Symptoms have significantly improved.   No longer having pain  ROM is good  No additional fevers  Has completed course of antibiotic  Swelling has significantly decreased, as has redness    Feels like there may be some fluid there still    Had been resting her elbow on hard surface prior to onset of symptoms     Problem list and histories reviewed & adjusted, as indicated.  Additional history: as documented    Patient Active Problem List   Diagnosis     CARDIOVASCULAR SCREENING; LDL GOAL LESS THAN 160     Obesity (BMI 30-39.9)     Migraines     Past Surgical History:   Procedure Laterality Date     BUNIONECTOMY  2003    bilateral     C ORAL SURGERY PROCEDURE      wisdom teeth     ORTHOPEDIC SURGERY  2003    bilateral bunionectomy       Social History   Substance Use Topics     Smoking status: Never Smoker     Smokeless tobacco: Never Used     Alcohol use 0.0 oz/week     Family History   Problem Relation Age of Onset     Hypertension Mother       age 71     C.A.D. Mother      Lipids Father      Hyperlipidemia Father      Breast Cancer Paternal Aunt      Cousin as well         Current Outpatient Prescriptions   Medication Sig Dispense Refill     UNABLE TO FIND MEDICATION NAME: OTC allergy eye drop       UNABLE TO FIND MEDICATION NAME: Grape fruit seed extract       Docosahexaenoic Acid (DHA PO)        Cholecalciferol (VITAMIN D-3 PO)        Borage, Borago officinalis, (BORAGE OIL PO)     "    Probiotic Product (PROBIOTIC DAILY PO)        multivitamin, therapeutic with minerals (THERA-VIT-M) TABS        loratadine (CLARITIN) 10 MG tablet Take 10 mg by mouth       triamcinolone (NASACORT AQ) 55 MCG/ACT nasal inhaler Spray 2 sprays into both nostrils daily. 3 Inhaler 3     azelastine (OPTIVAR) 0.05 % SOLN Apply 1 drop to eye 2 times daily. 1 Bottle 3     sulfamethoxazole-trimethoprim (BACTRIM DS/SEPTRA DS) 800-160 MG per tablet Take 1 tablet by mouth 2 times daily (Patient not taking: Reported on 9/26/2017) 20 tablet 0     No Known Allergies      Reviewed and updated as needed this visit by clinical staff       Reviewed and updated as needed this visit by Provider         ROS:  CONSTITUTIONAL:NEGATIVE  for chills and fever  INTEGUMENTARY/SKIN: NEGATIVE for redness and swelling  MUSCULOSKELETAL: POSITIVE  for very mild L elbow tenderness  NEURO: NEGATIVE for numbness or tingling    OBJECTIVE:     /78 (BP Location: Right arm, Patient Position: Sitting, Cuff Size: Adult Regular)  Pulse 93  Temp 98.5  F (36.9  C) (Oral)  Ht 5' 5\" (1.651 m)  Wt 218 lb (98.9 kg)  SpO2 98%  BMI 36.28 kg/m2  Body mass index is 36.28 kg/(m^2).  GENERAL: healthy, alert and no distress  RESP: lungs clear to auscultation - no rales, rhonchi or wheezes  CV: regular rate and rhythm, normal S1 S2, no S3 or S4, no murmur, click or rub, no peripheral edema and peripheral pulses strong  MS: No tenderness to palpation of L elbow. Small effusion overlying olecranon.  SKIN: Nickel-sized area of faint erythema overlying olecranon. Mild desquamation in this region    Diagnostic Test Results:  none     ASSESSMENT/PLAN:     1. Cellulitis of left elbow  Symptoms have significantly improved. Has completed antibiotic. No evidence of ongoing infection. May have residual effusion, but no evidence of septic joint. Recommend icing and ibuprofen to help with residual inflammation. Follow-up if symptoms worsen again.     Calli Miguel, " JUDITH  [facility] [place]

## 2017-09-26 NOTE — NURSING NOTE
"Chief Complaint   Patient presents with     Urgent Care f/u       Initial /78 (BP Location: Right arm, Patient Position: Sitting, Cuff Size: Adult Regular)  Pulse 93  Temp 98.5  F (36.9  C) (Oral)  Ht 5' 5\" (1.651 m)  Wt 218 lb (98.9 kg)  SpO2 98%  BMI 36.28 kg/m2 Estimated body mass index is 36.28 kg/(m^2) as calculated from the following:    Height as of this encounter: 5' 5\" (1.651 m).    Weight as of this encounter: 218 lb (98.9 kg).  Medication Reconciliation: complete   Rosette Ervin MA    "

## 2018-04-10 ENCOUNTER — HOSPITAL ENCOUNTER (OUTPATIENT)
Dept: MAMMOGRAPHY | Facility: CLINIC | Age: 43
Discharge: HOME | End: 2018-04-10
Attending: OBSTETRICS & GYNECOLOGY | Admitting: OBSTETRICS & GYNECOLOGY
Payer: COMMERCIAL

## 2018-04-10 DIAGNOSIS — Z12.31 VISIT FOR SCREENING MAMMOGRAM: ICD-10-CM

## 2018-04-10 PROCEDURE — 77067 SCR MAMMO BI INCL CAD: CPT

## 2018-12-10 ENCOUNTER — OFFICE VISIT (OUTPATIENT)
Dept: FAMILY MEDICINE | Facility: CLINIC | Age: 43
End: 2018-12-10
Payer: COMMERCIAL

## 2018-12-10 VITALS — DIASTOLIC BLOOD PRESSURE: 84 MMHG | SYSTOLIC BLOOD PRESSURE: 128 MMHG

## 2018-12-10 DIAGNOSIS — Z11.4 ENCOUNTER FOR SCREENING FOR HIV: ICD-10-CM

## 2018-12-10 DIAGNOSIS — Z13.21 ENCOUNTER FOR VITAMIN DEFICIENCY SCREENING: ICD-10-CM

## 2018-12-10 DIAGNOSIS — Z23 NEED FOR INFLUENZA VACCINATION: ICD-10-CM

## 2018-12-10 DIAGNOSIS — E66.09 CLASS 1 OBESITY DUE TO EXCESS CALORIES WITHOUT SERIOUS COMORBIDITY WITH BODY MASS INDEX (BMI) OF 34.0 TO 34.9 IN ADULT: ICD-10-CM

## 2018-12-10 DIAGNOSIS — E66.811 CLASS 1 OBESITY DUE TO EXCESS CALORIES WITHOUT SERIOUS COMORBIDITY WITH BODY MASS INDEX (BMI) OF 34.0 TO 34.9 IN ADULT: ICD-10-CM

## 2018-12-10 DIAGNOSIS — L30.8 OTHER ECZEMA: Primary | ICD-10-CM

## 2018-12-10 DIAGNOSIS — Z13.6 CARDIOVASCULAR SCREENING; LDL GOAL LESS THAN 160: ICD-10-CM

## 2018-12-10 DIAGNOSIS — Z13.1 SCREENING FOR DIABETES MELLITUS: ICD-10-CM

## 2018-12-10 PROCEDURE — 99214 OFFICE O/P EST MOD 30 MIN: CPT | Performed by: PHYSICIAN ASSISTANT

## 2018-12-10 RX ORDER — TRIAMCINOLONE ACETONIDE 1 MG/G
OINTMENT TOPICAL 2 TIMES DAILY
Qty: 15 G | Refills: 0 | Status: SHIPPED | OUTPATIENT
Start: 2018-12-10 | End: 2019-12-10

## 2018-12-10 ASSESSMENT — MIFFLIN-ST. JEOR: SCORE: 1594.83

## 2018-12-10 NOTE — PROGRESS NOTES
"  SUBJECTIVE:   Jacinta Robertson is a 43 year old female who presents to clinic today for the following health issues:      1) Discuss skin issues - eczema. Patches on her hands and feet. Seems to start like a little bubble and then comes up. Eczema since childhood. In college had on her feet and hands where it would cause more \"peeling.\" Recently over the past 1 yr has flared up again and over the weekend will scale, split and then become swollen. Pharmacist advised to try \"exederm\" which she shows me is a 1% OTC hydrocortisone cream. Advised to seen by pharmacist in 1 week if not improving.   Denies hyperhydrosis.   Sochx:  at elementary school - uses a lot of sanitizers and this can be aggravating. Only works 3.5 hours.  Has baseline non-fragranced OTC lotion.  Recently tried aquaphor.  Shea butter has helped.       2) Continuing care with nutritionist - originally went and improved, but then stopped care for a bit and gained her weight back. Thus, got re-invested in her care and went back to the nutritionist.   [facility]. Philosophy is eating \"real food.\"   Last seen just before Thanksgiving. Has been closely watching her weight and BP. Cut out grains for 2 months and then recently cut out dairy as felt this could also be contributing to her eczema.   BP's usually running in 120-133 systolic and mid 80's diastolic.   BP machine uses new guideline of < 130/80 as threshold.   Overall BPs better than 1 yr ago. Had been in the 130-140s systolic then.  Has lost 23 lbs in the past 1 yr.   Harder during the holiday's, but is trying to maintain as best she can.   Exercise - started in August working out M-F. 30-45 min or cardio or weight lifting. Will do \"beach body on demand.\"  Main concern is doesn't want to be come diabetic and be able to stay off BP medication.       Problem list and histories reviewed & adjusted, as indicated.  Additional history: as documented    Patient Active " Problem List   Diagnosis     CARDIOVASCULAR SCREENING; LDL GOAL LESS THAN 160     Migraines     Class 1 obesity due to excess calories without serious comorbidity with body mass index (BMI) of 34.0 to 34.9 in adult     Past Surgical History:   Procedure Laterality Date     BUNIONECTOMY  2003    bilateral     C ORAL SURGERY PROCEDURE      wisdom teeth     ORTHOPEDIC SURGERY  2003    bilateral bunionectomy       Social History     Tobacco Use     Smoking status: Never Smoker     Smokeless tobacco: Never Used   Substance Use Topics     Alcohol use: Yes     Alcohol/week: 0.0 oz     Family History   Problem Relation Age of Onset     Hypertension Mother          age 71     C.A.D. Mother      Lipids Father      Hyperlipidemia Father      Breast Cancer Paternal Aunt         Cousin as well         Current Outpatient Medications   Medication Sig Dispense Refill     triamcinolone (KENALOG) 0.1 % external ointment Apply topically 2 times daily 15 g 0     azelastine (OPTIVAR) 0.05 % SOLN Apply 1 drop to eye 2 times daily. 1 Bottle 3     Borage, Borago officinalis, (BORAGE OIL PO)        Cholecalciferol (VITAMIN D-3 PO)        Docosahexaenoic Acid (DHA PO)        loratadine (CLARITIN) 10 MG tablet Take 10 mg by mouth       multivitamin, therapeutic with minerals (THERA-VIT-M) TABS        Probiotic Product (PROBIOTIC DAILY PO)        sulfamethoxazole-trimethoprim (BACTRIM DS/SEPTRA DS) 800-160 MG per tablet Take 1 tablet by mouth 2 times daily (Patient not taking: Reported on 2017) 20 tablet 0     triamcinolone (NASACORT AQ) 55 MCG/ACT nasal inhaler Spray 2 sprays into both nostrils daily. 3 Inhaler 3     UNABLE TO FIND MEDICATION NAME: OTC allergy eye drop       UNABLE TO FIND MEDICATION NAME: Grape fruit seed extract       No Known Allergies    Reviewed and updated as needed this visit by clinical staff  Tobacco  Allergies  Meds  Problems  Med Hx  Surg Hx  Fam Hx  Soc Hx        Reviewed and updated as needed  "this visit by Provider  Tobacco  Allergies  Meds  Problems  Med Hx  Surg Hx  Fam Hx  Soc Hx          ROS:  Constitutional, HEENT, cardiovascular, pulmonary, integumentary systems are negative, except as otherwise noted.    OBJECTIVE:     /84   Pulse (P) 82   Temp (P) 98.2  F (36.8  C) (Oral)   Ht (P) 1.651 m (5' 5\")   Wt (P) 93.9 kg (207 lb)   SpO2 (P) 98%   BMI (P) 34.45 kg/m    Body mass index is 34.45 kg/m  (pended).  GENERAL: healthy, alert and no distress  SKIN: pt shows me patches of erythema, scaling, excoriation and cracking primarily along L ulnar digit and R radial 4th digit. Also shows me her L medial arch starting out as small flesh-colored vesicles right under the skin.     Diagnostic Test Results:  none     ASSESSMENT/PLAN:       ICD-10-CM    1. Other eczema L30.8 SKIN CARE REFERRAL     triamcinolone (KENALOG) 0.1 % external ointment   2. Class 1 obesity due to excess calories without serious comorbidity with body mass index (BMI) of 34.0 to 34.9 in adult E66.09     Z68.34    3. CARDIOVASCULAR SCREENING; LDL GOAL LESS THAN 160 Z13.6 Lipid panel reflex to direct LDL Fasting   4. Screening for diabetes mellitus Z13.1 **Comprehensive metabolic panel FUTURE 2mo   5. Encounter for screening for HIV Z11.4 **HIV Antigen Antibody Combo FUTURE 2mo   6. Encounter for vitamin deficiency screening Z13.21 **Vitamin D Deficiency FUTURE 2mo   After I printed AVS, pt requested to have future labs placed to do in 1 month and see her OB/GYN for routine physical so told ok to have labs done through us.  Encouraged to continue weight loss efforts and seeing nutritionist as planned.  Got to update her diagnosis as BMI decreased from 37 down to 34 today!  See Patient Instructions regarding eczema plan.  Pt in agreement.  A total of 25 minutes was spent with the patient today, with greater than 50% of the visit involving counseling and coordination of care regarding that noted above and in pt " instructions.  Patient Instructions   Discussed potential chronicity/relapsing/remitting nature and importance of maintaining good baseline moisturizing routine, avoidance of chemical irritants.  Ok to increase potency of steroid to see if this helps as well.  Risks reviewed.  Will also place referral to skin clinic to help establish long-term treatment routine if not improving.     Kristin Culp PA-C  [facility]

## 2018-12-10 NOTE — PATIENT INSTRUCTIONS
Discussed potential chronicity/relapsing/remitting nature and importance of maintaining good baseline moisturizing routine, avoidance of chemical irritants.  Ok to increase potency of steroid to see if this helps as well.  Risks reviewed.  Will also place referral to skin clinic to help establish long-term treatment routine if not improving.

## 2018-12-31 ENCOUNTER — OFFICE VISIT (OUTPATIENT)
Dept: FAMILY MEDICINE | Facility: CLINIC | Age: 43
End: 2018-12-31
Payer: COMMERCIAL

## 2018-12-31 VITALS — DIASTOLIC BLOOD PRESSURE: 87 MMHG | SYSTOLIC BLOOD PRESSURE: 132 MMHG | HEART RATE: 76 BPM

## 2018-12-31 DIAGNOSIS — L30.9 ECZEMA, UNSPECIFIED TYPE: Primary | ICD-10-CM

## 2018-12-31 PROCEDURE — 99214 OFFICE O/P EST MOD 30 MIN: CPT | Performed by: FAMILY MEDICINE

## 2018-12-31 RX ORDER — BETAMETHASONE DIPROPIONATE 0.5 MG/G
OINTMENT, AUGMENTED TOPICAL 2 TIMES DAILY
Qty: 50 G | Refills: 1 | Status: SHIPPED | OUTPATIENT
Start: 2018-12-31 | End: 2019-12-31

## 2018-12-31 RX ORDER — CEPHALEXIN 500 MG/1
500 CAPSULE ORAL 2 TIMES DAILY
Qty: 28 CAPSULE | Refills: 0 | Status: SHIPPED | OUTPATIENT
Start: 2018-12-31 | End: 2019-03-25

## 2018-12-31 NOTE — LETTER
12/31/2018         RE: Jacinta Robertson  [address] [id number]        Dear Colleague,    [facility] you [facility] your [facility], [facility] Robertson[facility]. Please see a copy of my visit note below.    [facility] - PRIMARY CARE SKIN    CC: Eczema  SUBJECTIVE:   Jacinta Robertson is a(n) 43 year old female who presents to clinic today because of eczema that started years ago. Symptoms have flared over the last 3 months.    Areas of involvement: hands, left foot.    Associated symptoms: open cracks, itchiness.  Symptoms appear to be: worsening.  Aggravating factors: occupational exposure with frequent handwashing. Symptoms are worse during allergy season or with dry skin, such as when blowing her nose frequently with a cold.    Recent exposure history: none identified  Previous history of a similar rash: YES - in childhood most prominently on the face, on hands and feet since young adulthood.  Any other family members with similar symptoms: NO.    Products used: Shea butter moisturizer on hands. Oil free moisturizer with SPF used on the face. Sunleaf naturals bar soap used.     Therapies tried: triamcinolone 0.1% ointment used for 1 week. Exederm also used previously. She has tried to use gloves at work. Aquaphor previously.  She uses Claritin during allergy season.    Issue Two: A rash on the face has developed after a facial peel.  Issue Three: A spot on the lip.  Issue Four: Hairs are growing within a mole on the left jawline. This mole is occasionally intensely itchy.    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    Patient Active Problem List   Diagnosis     CARDIOVASCULAR SCREENING; LDL GOAL LESS  THAN 160     Migraines     Class 1 obesity due to excess calories without serious comorbidity with body mass index (BMI) of 34.0 to 34.9 in adult       Past Medical History:   Diagnosis Date     Hx of major depression      Hypertension      Obesity (BMI 30-39.9) 3/29/2013     SHAILA (obstructive sleep apnea)     CPAP     Seasonal allergies     Past Surgical History:   Procedure Laterality Date     BUNIONECTOMY  2003    bilateral     C ORAL SURGERY PROCEDURE      wisdom teeth     ORTHOPEDIC SURGERY  2003    bilateral bunionectomy      Social History     Tobacco Use     Smoking status: Never Smoker     Smokeless tobacco: Never Used   Substance Use Topics     Alcohol use: Yes     Alcohol/week: 0.0 oz     Drug use: No    Family History     Problem (# of Occurrences) Relation (Name,Age of Onset)    Breast Cancer (1) Paternal Aunt: Cousin as well    C.A.D. (1) Mother    Hyperlipidemia (1) Father    Hypertension (1) Mother:  age 71    Lipids (1) Father           Current Outpatient Medications   Medication Sig Dispense Refill     Borage, Borago officinalis, (BORAGE OIL PO)        Cholecalciferol (VITAMIN D-3 PO)        Docosahexaenoic Acid (DHA PO)        Probiotic Product (PROBIOTIC DAILY PO)        triamcinolone (NASACORT AQ) 55 MCG/ACT nasal inhaler Spray 2 sprays into both nostrils daily. 3 Inhaler 3     UNABLE TO FIND MEDICATION NAME: OTC allergy eye drop       loratadine (CLARITIN) 10 MG tablet Take 10 mg by mouth       triamcinolone (KENALOG) 0.1 % external ointment Apply topically 2 times daily (Patient not taking: Reported on 2018) 15 g 0     UNABLE TO FIND MEDICATION NAME: Grape fruit seed extract         No Known Allergies     INTEGUMENTARY/SKIN: POSITIVE for pruritis and rash  ROS: 14 point review of systems was negative except the symptoms listed above in the HPI.    This document serves as a record of the services and decisions personally performed and made by Halima Bundy MD and was created  by Luis Eduardo Boone, a trained medical scribe, based on personal observations and provider statements to the medical scribe.  December 31, 2018 11:14 AM   Luis Eduardo Boone    OBJECTIVE:   GENERAL: healthy, alert and no distress.  SKIN: Wei Skin Type - II.  Face, Hands and Feet examined. The dermatoscope was used to help evaluate pigmented lesions.  Skin Pertinent Findings:  Left foot, medial plantar surface: some light scaling and erythema    Hands: Erythema, scaling, and fissuring eruption on left index finger, right ring finger. Small, discrete papules on dorsa of fingers and hands.    Fingernails: Normal    Mid-lower lip: 3 mm in size hypopigmented scaly lesion.    No rash on elbows, knees.    Diagnostic Test Results:  none     ASSESSMENT:     Encounter Diagnosis   Name Primary?     Eczema, unspecified type Yes         PLAN:   Patient Instructions   DRY SKIN MANAGEMENT INSTRUCTIONS  Routine use of moisturizer is important for healthy, resilient skin not just for soft skin.     Apply moisturizer every time after washing your hands.    Sealing in moisture    Twice daily use of a moisturizer such as over-the-counter (OTC) CeraVe moisturizer cream (in the jar). CeraVe products contain ceramides and filaggrin proteins that can help to maintain the body's moisture layer.    After cleansing or washing, always apply moisturizer immediately after drying off (pat dry only) for best effect.    Protection while hydrating    Do not overuse soap. Unless you have been sweating extensively, just apply soap to groin and armpits.    Recommended products for body include: OTC unscented Dove for sensitive skin or OTC Vanicream cleansing bar.    Recommended facial cleansers include: OTC CeraVe hydrating facial cleanser or OTC Cetaphil daily facial cleanser.    Always try to wear rubber gloves when washing dishes or cleaning.    Avoid use of    Scented/perfumed products    Irritating clothing (wool, new jeans, new/unwashed clothing,  "scratchy synthetics)    Neosporin or triple antibiotic topical products    Products containing aloe, herbs, Vitamin E, or other \"natural ingredients\".    Dryer sheets or fabric softeners (while symptoms are present)    If a topical medication is prescribed, apply topical prescription first, followed by use of moisturizing product.    TOPICAL STEROID INSTRUCTIONS  augmented betamethasone dipropionate 0.05% ointment.  1. Wash hands before applying topical steroid. Use the adult fingertip unit (FTU) as a guide.    2. Apply sparingly (just enough to rub in) onto affected areas of the hands and feet (not to exceed 0.5 FTU per area), two times per day for 10-14 days.  3. Wash off any excess, unused topical steroid.    This higher strength steroid should never be used on face nor groin.    After the initial treatment, topical steroid may be used as needed for flare-ups but only for short-term treatment.    If you are using this for prolonged periods of time to control flare-ups, return to clinic for re-evaluation of treatment.    Keep in mind to also regularly use moisturizer, as this preventative measure can help maintain your skin's natural protective moisture barrier.  Wear cotton socks and white, cotton gloves (can purchase at any retail pharmacy) after application of moisturizer and topical steroid nightly at bedtime and wear until the morning.    TOPICAL STEROID INSTRUCTIONS  OTC hydrocortisone 1% ointment.  1. Wash hands before applying topical steroid. Use the adult fingertip unit (FTU) as a guide.    2. Apply sparingly (just enough to rub in) onto affected areas of the lip (not to exceed 0.25 FTU), two times per day for 3-5 days.  3. Wash off any excess, unused topical steroid.    This is a weak strength steroid, and it can be used on thinner skin areas such as the face.    After the initial treatment, topical steroid may be used as needed for flare-ups but only for short-term treatment.    If you are using this " for prolonged periods of time to control flare-ups, return to clinic for re-evaluation of treatment.    Keep in mind to also regularly use moisturizer, as this preventative measure can help maintain your skin's natural protective moisture barrier.    ORAL ANTIBIOTIC  Take by mouth 1 capsule/tablet of cephalexin 500 mg two time(s) a day for 2 weeks. Make sure to complete the entire antibiotic regimen as instructed to prevent development of bacterial resistance to antibiotics.    ORAL ANTIHISTAMINE  Take by mouth, 1 tablet(s) of OTC loratadine (Claritin) 10 mg once per day.    The patient was counseled to use products free of fragrance, dyes, and plants. The importance of using bland cleansers and the regular use of heavy bland emollient creams was impressed upon the patient.    TT: 25 minutes.  CT: 20 minutes.    The information in this document, created by the medical scribe for me, accurately reflects the services I personally performed and the decisions made by me. I have reviewed and approved this document for accuracy prior to leaving the patient care area.  December 31, 2018 11:14 AM  Halima Bundy MD  [facility]    Again, thank you for allowing me to participate in the care of your patient.        Sincerely,        Halima Bundy MD

## 2018-12-31 NOTE — PROGRESS NOTES
[facility] - PRIMARY CARE SKIN    CC: Eczema  SUBJECTIVE:   Jacinta Robertson is a(n) 43 year old female who presents to clinic today because of eczema that started years ago. Symptoms have flared over the last 3 months.    Areas of involvement: hands, left foot.    Associated symptoms: open cracks, itchiness.  Symptoms appear to be: worsening.  Aggravating factors: occupational exposure with frequent handwashing. Symptoms are worse during allergy season or with dry skin, such as when blowing her nose frequently with a cold.    Recent exposure history: none identified  Previous history of a similar rash: YES - in childhood most prominently on the face, on hands and feet since young adulthood.  Any other family members with similar symptoms: NO.    Products used: Shea butter moisturizer on hands. Oil free moisturizer with SPF used on the face. Sunleaf naturals bar soap used.     Therapies tried: triamcinolone 0.1% ointment used for 1 week. Exederm also used previously. She has tried to use gloves at work. Aquaphor previously.  She uses Claritin during allergy season.    Issue Two: A rash on the face has developed after a facial peel.  Issue Three: A spot on the lip.  Issue Four: Hairs are growing within a mole on the left jawline. This mole is occasionally intensely itchy.    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    Patient Active Problem List   Diagnosis     CARDIOVASCULAR SCREENING; LDL GOAL LESS THAN 160     Migraines     Class 1 obesity due to excess calories without serious comorbidity with body mass index (BMI) of 34.0 to 34.9 in adult       Past Medical History:   Diagnosis Date     Hx of major depression      Hypertension      Obesity (BMI  30-39.9) 3/29/2013     SHAILA (obstructive sleep apnea)     CPAP     Seasonal allergies     Past Surgical History:   Procedure Laterality Date     BUNIONECTOMY  2003    bilateral     C ORAL SURGERY PROCEDURE      wisdom teeth     ORTHOPEDIC SURGERY  2003    bilateral bunionectomy      Social History     Tobacco Use     Smoking status: Never Smoker     Smokeless tobacco: Never Used   Substance Use Topics     Alcohol use: Yes     Alcohol/week: 0.0 oz     Drug use: No    Family History     Problem (# of Occurrences) Relation (Name,Age of Onset)    Breast Cancer (1) Paternal Aunt: Cousin as well    C.A.D. (1) Mother    Hyperlipidemia (1) Father    Hypertension (1) Mother:  age 71    Lipids (1) Father           Current Outpatient Medications   Medication Sig Dispense Refill     Borage, Borago officinalis, (BORAGE OIL PO)        Cholecalciferol (VITAMIN D-3 PO)        Docosahexaenoic Acid (DHA PO)        Probiotic Product (PROBIOTIC DAILY PO)        triamcinolone (NASACORT AQ) 55 MCG/ACT nasal inhaler Spray 2 sprays into both nostrils daily. 3 Inhaler 3     UNABLE TO FIND MEDICATION NAME: OTC allergy eye drop       loratadine (CLARITIN) 10 MG tablet Take 10 mg by mouth       triamcinolone (KENALOG) 0.1 % external ointment Apply topically 2 times daily (Patient not taking: Reported on 2018) 15 g 0     UNABLE TO FIND MEDICATION NAME: Grape fruit seed extract         No Known Allergies     INTEGUMENTARY/SKIN: POSITIVE for pruritis and rash  ROS: 14 point review of systems was negative except the symptoms listed above in the HPI.    This document serves as a record of the services and decisions personally performed and made by Halima Bundy MD and was created by Luis Eduardo Boone, a trained medical scribe, based on personal observations and provider statements to the medical scribe.  2018 11:14 AM   Luis Eduardo Boone    OBJECTIVE:   GENERAL: healthy, alert and no distress.  SKIN: Wei Skin Type -  "II.  Face, Hands and Feet examined. The dermatoscope was used to help evaluate pigmented lesions.  Skin Pertinent Findings:  Left foot, medial plantar surface: some light scaling and erythema    Hands: Erythema, scaling, and fissuring eruption on left index finger, right ring finger. Small, discrete papules on dorsa of fingers and hands.    Fingernails: Normal    Mid-lower lip: 3 mm in size hypopigmented scaly lesion.    No rash on elbows, knees.    Diagnostic Test Results:  none     ASSESSMENT:     Encounter Diagnosis   Name Primary?     Eczema, unspecified type Yes         PLAN:   Patient Instructions   DRY SKIN MANAGEMENT INSTRUCTIONS  Routine use of moisturizer is important for healthy, resilient skin not just for soft skin.     Apply moisturizer every time after washing your hands.    Sealing in moisture    Twice daily use of a moisturizer such as over-the-counter (OTC) CeraVe moisturizer cream (in the jar). CeraVe products contain ceramides and filaggrin proteins that can help to maintain the body's moisture layer.    After cleansing or washing, always apply moisturizer immediately after drying off (pat dry only) for best effect.    Protection while hydrating    Do not overuse soap. Unless you have been sweating extensively, just apply soap to groin and armpits.    Recommended products for body include: OTC unscented Dove for sensitive skin or OTC Vanicream cleansing bar.    Recommended facial cleansers include: OTC CeraVe hydrating facial cleanser or OTC Cetaphil daily facial cleanser.    Always try to wear rubber gloves when washing dishes or cleaning.    Avoid use of    Scented/perfumed products    Irritating clothing (wool, new jeans, new/unwashed clothing, scratchy synthetics)    Neosporin or triple antibiotic topical products    Products containing aloe, herbs, Vitamin E, or other \"natural ingredients\".    Dryer sheets or fabric softeners (while symptoms are present)    If a topical medication is " prescribed, apply topical prescription first, followed by use of moisturizing product.    TOPICAL STEROID INSTRUCTIONS  augmented betamethasone dipropionate 0.05% ointment.  1. Wash hands before applying topical steroid. Use the adult fingertip unit (FTU) as a guide.    2. Apply sparingly (just enough to rub in) onto affected areas of the hands and feet (not to exceed 0.5 FTU per area), two times per day for 10-14 days.  3. Wash off any excess, unused topical steroid.    This higher strength steroid should never be used on face nor groin.    After the initial treatment, topical steroid may be used as needed for flare-ups but only for short-term treatment.    If you are using this for prolonged periods of time to control flare-ups, return to clinic for re-evaluation of treatment.    Keep in mind to also regularly use moisturizer, as this preventative measure can help maintain your skin's natural protective moisture barrier.  Wear cotton socks and white, cotton gloves (can purchase at any retail pharmacy) after application of moisturizer and topical steroid nightly at bedtime and wear until the morning.    TOPICAL STEROID INSTRUCTIONS  OTC hydrocortisone 1% ointment.  1. Wash hands before applying topical steroid. Use the adult fingertip unit (FTU) as a guide.    2. Apply sparingly (just enough to rub in) onto affected areas of the lip (not to exceed 0.25 FTU), two times per day for 3-5 days.  3. Wash off any excess, unused topical steroid.    This is a weak strength steroid, and it can be used on thinner skin areas such as the face.    After the initial treatment, topical steroid may be used as needed for flare-ups but only for short-term treatment.    If you are using this for prolonged periods of time to control flare-ups, return to clinic for re-evaluation of treatment.    Keep in mind to also regularly use moisturizer, as this preventative measure can help maintain your skin's natural protective moisture  barrier.    ORAL ANTIBIOTIC  Take by mouth 1 capsule/tablet of cephalexin 500 mg two time(s) a day for 2 weeks. Make sure to complete the entire antibiotic regimen as instructed to prevent development of bacterial resistance to antibiotics.    ORAL ANTIHISTAMINE  Take by mouth, 1 tablet(s) of OTC loratadine (Claritin) 10 mg once per day.    The patient was counseled to use products free of fragrance, dyes, and plants. The importance of using bland cleansers and the regular use of heavy bland emollient creams was impressed upon the patient.    TT: 25 minutes.  CT: 20 minutes.    The information in this document, created by the medical scribe for me, accurately reflects the services I personally performed and the decisions made by me. I have reviewed and approved this document for accuracy prior to leaving the patient care area.  December 31, 2018 11:14 AM  Halima Bundy MD  [facility]

## 2018-12-31 NOTE — PATIENT INSTRUCTIONS
"DRY SKIN MANAGEMENT INSTRUCTIONS  Routine use of moisturizer is important for healthy, resilient skin not just for soft skin.     Apply moisturizer every time after washing your hands.    Sealing in moisture    Twice daily use of a moisturizer such as over-the-counter (OTC) CeraVe moisturizer cream (in the jar). CeraVe products contain ceramides and filaggrin proteins that can help to maintain the body's moisture layer.    After cleansing or washing, always apply moisturizer immediately after drying off (pat dry only) for best effect.    Protection while hydrating    Do not overuse soap. Unless you have been sweating extensively, just apply soap to groin and armpits.    Recommended products for body include: OTC unscented Dove for sensitive skin or OTC Vanicream cleansing bar.    Recommended facial cleansers include: OTC CeraVe hydrating facial cleanser or OTC Cetaphil daily facial cleanser.    Always try to wear rubber gloves when washing dishes or cleaning.    Avoid use of    Scented/perfumed products    Irritating clothing (wool, new jeans, new/unwashed clothing, scratchy synthetics)    Neosporin or triple antibiotic topical products    Products containing aloe, herbs, Vitamin E, or other \"natural ingredients\".    Dryer sheets or fabric softeners (while symptoms are present)    If a topical medication is prescribed, apply topical prescription first, followed by use of moisturizing product.    TOPICAL STEROID INSTRUCTIONS  augmented betamethasone dipropionate 0.05% ointment.  1. Wash hands before applying topical steroid. Use the adult fingertip unit (FTU) as a guide.    2. Apply sparingly (just enough to rub in) onto affected areas of the hands and feet (not to exceed 0.5 FTU per area), two times per day for 10-14 days.  3. Wash off any excess, unused topical steroid.    This higher strength steroid should never be used on face nor groin.    After the initial treatment, topical steroid may be used as needed for " flare-ups but only for short-term treatment.    If you are using this for prolonged periods of time to control flare-ups, return to clinic for re-evaluation of treatment.    Keep in mind to also regularly use moisturizer, as this preventative measure can help maintain your skin's natural protective moisture barrier.  Wear cotton socks and white, cotton gloves (can purchase at any retail pharmacy) after application of moisturizer and topical steroid nightly at bedtime and wear until the morning.    TOPICAL STEROID INSTRUCTIONS  OTC hydrocortisone 1% ointment.  1. Wash hands before applying topical steroid. Use the adult fingertip unit (FTU) as a guide.    2. Apply sparingly (just enough to rub in) onto affected areas of the lip (not to exceed 0.25 FTU), two times per day for 3-5 days.  3. Wash off any excess, unused topical steroid.    This is a weak strength steroid, and it can be used on thinner skin areas such as the face.    After the initial treatment, topical steroid may be used as needed for flare-ups but only for short-term treatment.    If you are using this for prolonged periods of time to control flare-ups, return to clinic for re-evaluation of treatment.    Keep in mind to also regularly use moisturizer, as this preventative measure can help maintain your skin's natural protective moisture barrier.    ORAL ANTIBIOTIC  Take by mouth 1 capsule/tablet of cephalexin 500 mg two time(s) a day for 2 weeks. Make sure to complete the entire antibiotic regimen as instructed to prevent development of bacterial resistance to antibiotics.    ORAL ANTIHISTAMINE  Take by mouth, 1 tablet(s) of OTC loratadine (Claritin) 10 mg once per day.

## 2019-01-26 DIAGNOSIS — Z11.4 ENCOUNTER FOR SCREENING FOR HIV: ICD-10-CM

## 2019-01-26 DIAGNOSIS — Z13.21 ENCOUNTER FOR VITAMIN DEFICIENCY SCREENING: ICD-10-CM

## 2019-01-26 DIAGNOSIS — Z13.6 CARDIOVASCULAR SCREENING; LDL GOAL LESS THAN 160: ICD-10-CM

## 2019-01-26 DIAGNOSIS — Z13.1 SCREENING FOR DIABETES MELLITUS: ICD-10-CM

## 2019-01-26 LAB
ALBUMIN SERPL-MCNC: 3.7 G/DL (ref 3.4–5)
ALP SERPL-CCNC: 62 U/L (ref 40–150)
ALT SERPL W P-5'-P-CCNC: 35 U/L (ref 0–50)
ANION GAP SERPL CALCULATED.3IONS-SCNC: 4 MMOL/L (ref 3–14)
AST SERPL W P-5'-P-CCNC: 21 U/L (ref 0–45)
BILIRUB SERPL-MCNC: 0.4 MG/DL (ref 0.2–1.3)
BUN SERPL-MCNC: 18 MG/DL (ref 7–30)
CALCIUM SERPL-MCNC: 9.1 MG/DL (ref 8.5–10.1)
CHLORIDE SERPL-SCNC: 106 MMOL/L (ref 94–109)
CHOLEST SERPL-MCNC: 184 MG/DL
CO2 SERPL-SCNC: 26 MMOL/L (ref 20–32)
CREAT SERPL-MCNC: 0.73 MG/DL (ref 0.52–1.04)
GFR SERPL CREATININE-BSD FRML MDRD: >90 ML/MIN/{1.73_M2}
GLUCOSE SERPL-MCNC: 90 MG/DL (ref 70–99)
HDLC SERPL-MCNC: 48 MG/DL
LDLC SERPL CALC-MCNC: 123 MG/DL
NONHDLC SERPL-MCNC: 136 MG/DL
POTASSIUM SERPL-SCNC: 4.2 MMOL/L (ref 3.4–5.3)
PROT SERPL-MCNC: 8.1 G/DL (ref 6.8–8.8)
SODIUM SERPL-SCNC: 136 MMOL/L (ref 133–144)
TRIGL SERPL-MCNC: 63 MG/DL

## 2019-01-26 PROCEDURE — 87389 HIV-1 AG W/HIV-1&-2 AB AG IA: CPT | Performed by: PHYSICIAN ASSISTANT

## 2019-01-26 PROCEDURE — 82306 VITAMIN D 25 HYDROXY: CPT | Performed by: PHYSICIAN ASSISTANT

## 2019-01-26 PROCEDURE — 80061 LIPID PANEL: CPT | Performed by: PHYSICIAN ASSISTANT

## 2019-01-26 PROCEDURE — 36415 COLL VENOUS BLD VENIPUNCTURE: CPT | Performed by: PHYSICIAN ASSISTANT

## 2019-01-26 PROCEDURE — 80053 COMPREHEN METABOLIC PANEL: CPT | Performed by: PHYSICIAN ASSISTANT

## 2019-01-28 ENCOUNTER — TELEPHONE (OUTPATIENT)
Dept: FAMILY MEDICINE | Facility: CLINIC | Age: 44
End: 2019-01-28

## 2019-01-28 ENCOUNTER — OFFICE VISIT (OUTPATIENT)
Dept: FAMILY MEDICINE | Facility: CLINIC | Age: 44
End: 2019-01-28
Payer: COMMERCIAL

## 2019-01-28 VITALS — SYSTOLIC BLOOD PRESSURE: 125 MMHG | OXYGEN SATURATION: 100 % | DIASTOLIC BLOOD PRESSURE: 85 MMHG | HEART RATE: 82 BPM

## 2019-01-28 DIAGNOSIS — Z13.1 SCREENING FOR DIABETES MELLITUS: Primary | ICD-10-CM

## 2019-01-28 DIAGNOSIS — L30.9 ECZEMA, UNSPECIFIED TYPE: Primary | ICD-10-CM

## 2019-01-28 DIAGNOSIS — L30.9 ECZEMA OF BOTH HANDS: ICD-10-CM

## 2019-01-28 LAB
DEPRECATED CALCIDIOL+CALCIFEROL SERPL-MC: 50 UG/L (ref 20–75)
HBA1C MFR BLD: 5.5 % (ref 0–5.6)
HIV 1+2 AB+HIV1 P24 AG SERPL QL IA: NONREACTIVE

## 2019-01-28 PROCEDURE — 36415 COLL VENOUS BLD VENIPUNCTURE: CPT | Performed by: PHYSICIAN ASSISTANT

## 2019-01-28 PROCEDURE — 83036 HEMOGLOBIN GLYCOSYLATED A1C: CPT | Performed by: PHYSICIAN ASSISTANT

## 2019-01-28 PROCEDURE — 99213 OFFICE O/P EST LOW 20 MIN: CPT | Performed by: FAMILY MEDICINE

## 2019-01-28 RX ORDER — AMOXICILLIN 500 MG
1200 CAPSULE ORAL DAILY
COMMUNITY

## 2019-01-28 NOTE — LETTER
1/28/2019         RE: Jacinta Robertson  [address] [id number]        Dear Colleague,    Thank you for referring your patient, Jacinta Robertson, to the [facility]. Please see a copy of my visit note below.    [facility] - PRIMARY CARE SKIN    CC: Eczema  SUBJECTIVE:   Jacinta Robertson is a(n) 43 year old female who presents to clinic today for follow-up of eczema that started years ago. A flare of symptoms began in September 2018. Flares are described as itchy pustules on the hands.    Current treatment:   CeraVe moisturizer. She is not using glove occlusion at night.  Claritin 10 mg every day. She only took it for 2 weeks.  Hands, feet: Augmented betamethasone dipropionate 0.05% ointment  Face: OTC hydrocortisone 1% ointment   Cephalexin 500 mg BID for 14 days begun 12/31/18    Response to treatment: Symptoms on the hands cleared while on antibiotic and topical steroids. A new patch began to develop on the right ring finger with the start of her menstrual cycle; other areas developed on other fingers. Use of topical steroid for 13 days provided relief of itchiness but not full resolution. However, upon discontinuation again, symptoms on the hands have flared again. Fissures have developed at the affected areas.  Side effects of treatment noted: None.     Aggravating factors: occupational exposure with frequent handwashing. Symptoms are worse during allergy season or with dry skin, such as when blowing her nose frequently with a cold.    Products used: Shea butter moisturizer on hands. Oil free moisturizer with SPF used on the face. Sunleaf naturals bar soap used.     Previous therapies tried: triamcinolone 0.1% ointment used for 1 week. Exederm also used previously. She has tried to use gloves at work. Aquaphor previously. She uses Claritin during allergy season.    Issue Two: The spot on the mid-lower lip has not been resolving with OTC hydrocortisone 1%  ointment.    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    Patient Active Problem List   Diagnosis     CARDIOVASCULAR SCREENING; LDL GOAL LESS THAN 160     Migraines     Class 1 obesity due to excess calories without serious comorbidity with body mass index (BMI) of 34.0 to 34.9 in adult       Past Medical History:   Diagnosis Date     Hx of major depression      Hypertension      Obesity (BMI 30-39.9) 3/29/2013     SHAILA (obstructive sleep apnea)     CPAP     Seasonal allergies     Past Surgical History:   Procedure Laterality Date     BUNIONECTOMY  2003    bilateral     C ORAL SURGERY PROCEDURE      wisdom teeth     ORTHOPEDIC SURGERY  2003    bilateral bunionectomy      Social History     Tobacco Use     Smoking status: Never Smoker     Smokeless tobacco: Never Used   Substance Use Topics     Alcohol use: Yes     Alcohol/week: 0.0 oz     Drug use: No    Family History     Problem (# of Occurrences) Relation (Name,Age of Onset)    Breast Cancer (1) Paternal Aunt: Cousin as well    C.A.D. (1) Mother    Hyperlipidemia (1) Father    Hypertension (1) Mother:  age 71    Lipids (1) Father           Current Outpatient Medications   Medication Sig Dispense Refill     augmented betamethasone dipropionate (DIPROLENE-AF) 0.05 % external ointment Apply topically 2 times daily to AA on hands and feet for 10-14 days. Not for use on face nor groin. 50 g 1     Borage, Borago officinalis, (BORAGE OIL PO)        Cholecalciferol (VITAMIN D-3 PO) Take 5,000 Units by mouth        Docosahexaenoic Acid (DHA PO)        Omega-3 Fatty Acids (FISH OIL) 1200 MG capsule Take 1,200 mg by mouth daily       loratadine (CLARITIN) 10 MG tablet Take 10 mg by mouth       Probiotic Product  (PROBIOTIC DAILY PO)        triamcinolone (KENALOG) 0.1 % external ointment Apply topically 2 times daily (Patient not taking: Reported on 1/28/2019) 15 g 0     triamcinolone (NASACORT AQ) 55 MCG/ACT nasal inhaler Spray 2 sprays into both nostrils daily. (Patient not taking: Reported on 1/28/2019) 3 Inhaler 3     UNABLE TO FIND MEDICATION NAME: OTC allergy eye drop         No Known Allergies     INTEGUMENTARY/SKIN: POSITIVE for pruritis and rash  ROS: 14 point review of systems was negative except the symptoms listed above in the HPI.    This document serves as a record of the services and decisions personally performed and made by Halima Bundy MD and was created by Luis Eduardo Boone, a trained medical scribe, based on personal observations and provider statements to the medical scribe.  January 28, 2019 1:55 PM   Luis Eduardo Boone    OBJECTIVE:   GENERAL: healthy, alert and no distress.  SKIN: Wei Skin Type - II.  Face, Hands examined. The dermatoscope was used to help evaluate pigmented lesions.  Skin Pertinent Findings:  Hands: Erythematous, scaly patches on right ring finger, left index finger. Some fissures on the ring finger.    ASSESSMENT:     Encounter Diagnoses   Name Primary?     Eczema, unspecified type Yes     Eczema of both hands    MDM: consider Protopic if not improving or not a sustained benefit with tapering of the topical steroid.    PLAN:   Patient Instructions   FUTURE APPOINTMENTS  Follow up in 2 week(s).    Continue applying CeraVe moisturizer cream (in the jar) regularly.    TOPICAL STEROID INSTRUCTIONS  augmented betamethasone dipropionate 0.05% ointment.  1. Wash hands before applying topical steroid.  2. Apply sparingly (just enough to rub in) onto affected areas of the hands.  (Use the adult fingertip unit (FTU) as a guide.) Apply no more than 0.25 FTU per area.      This higher strength steroid should never be used on face nor groin.    After the initial treatment, topical steroid may be  used as needed for flare-ups but only for short-term treatment. If you are using this for prolonged periods of time to control flare-ups, return to clinic for re-evaluation of treatment.    Keep in mind to also regularly use moisturizer, as this preventative measure can help maintain your skin's natural protective moisture barrier.    1. Wear socks and/or cotton gloves (can purchase at any retail pharmacy) after application of moisturizer and topical steroid nightly at bedtime and wear until the morning. Do this glove occlusion for 7 days.  2. After 7 days, decrease application of topical steroid to every other night without glove occlusion.    ORAL ANTIHISTAMINE  Take by mouth, 1 tablet(s) of OTC loratadine (Claritin) 10 mg once per day regardless if symptoms are present or not.    BLEACH BATH/SOAK INSTRUCTIONS  Bleach Bath/Soak:  A dilute bleach solution can be helpful in maintaining normal skin edu.  Frequency: 3 times a week    Perform a dilute bleach bath adding plain 6% bleach in the following amounts to warm bathwater. Do not use concentrated bleach.    Dilution amounts will be more dilute than typical swimming pools:    Full tub (approximately 40 gallons) - use 1/2 cup of bleach    Half tub - use 1/4 cup of bleach    Mixing bowl (or infants) - use 2 tbsp of bleach    Wait until water and bleach have been mixed before soaking.    Soak for 5-10 minutes.    Pat skin dry with white towels. Take care that bleach may stain towels.    Immediately after soaking, apply any topical medications to the body and to affected areas.    Follow with topical steroid and a bland emollient moisturizer or cream such as Vaseline or Aquaphor.    Never apply bleach directly to your skin.  Avoid oatmeal soaks, because they only provide short-term, temporary relief of itch.      Avoid manipulating or irritating the area on the lip.    The patient was counseled to use products free of fragrance, dyes, and plants. The importance of  using bland cleansers and the regular use of heavy bland emollient creams was impressed upon the patient.    TT: 25 minutes.  CT: 20 minutes.    The information in this document, created by the medical scribe for me, accurately reflects the services I personally performed and the decisions made by me. I have reviewed and approved this document for accuracy prior to leaving the patient care area.  January 28, 2019 1:54 PM  Halima Bundy MD  [facility]    Again, thank you for allowing me to participate in the care of your patient.        Sincerely,        Halima Bundy MD

## 2019-01-28 NOTE — PATIENT INSTRUCTIONS
FUTURE APPOINTMENTS  Follow up in 2 week(s).    Continue applying CeraVe moisturizer cream (in the jar) regularly.    TOPICAL STEROID INSTRUCTIONS  augmented betamethasone dipropionate 0.05% ointment.  1. Wash hands before applying topical steroid.  2. Apply sparingly (just enough to rub in) onto affected areas of the hands.  (Use the adult fingertip unit (FTU) as a guide.) Apply no more than 0.25 FTU per area.      This higher strength steroid should never be used on face nor groin.    After the initial treatment, topical steroid may be used as needed for flare-ups but only for short-term treatment. If you are using this for prolonged periods of time to control flare-ups, return to clinic for re-evaluation of treatment.    Keep in mind to also regularly use moisturizer, as this preventative measure can help maintain your skin's natural protective moisture barrier.    1. Wear socks and/or cotton gloves (can purchase at any retail pharmacy) after application of moisturizer and topical steroid nightly at bedtime and wear until the morning. Do this glove occlusion for 7 days.  2. After 7 days, decrease application of topical steroid to every other night without glove occlusion.    ORAL ANTIHISTAMINE  Take by mouth, 1 tablet(s) of OTC loratadine (Claritin) 10 mg once per day regardless if symptoms are present or not.    BLEACH BATH/SOAK INSTRUCTIONS  Bleach Bath/Soak:  A dilute bleach solution can be helpful in maintaining normal skin edu.  Frequency: 3 times a week    Perform a dilute bleach bath adding plain 6% bleach in the following amounts to warm bathwater. Do not use concentrated bleach.    Dilution amounts will be more dilute than typical swimming pools:    Full tub (approximately 40 gallons) - use 1/2 cup of bleach    Half tub - use 1/4 cup of bleach    Mixing bowl (or infants) - use 2 tbsp of bleach    Wait until water and bleach have been mixed before soaking.    Soak for 5-10 minutes.    Pat skin dry with  white towels. Take care that bleach may stain towels.    Immediately after soaking, apply any topical medications to the body and to affected areas.    Follow with topical steroid and a bland emollient moisturizer or cream such as Vaseline or Aquaphor.    Never apply bleach directly to your skin.  Avoid oatmeal soaks, because they only provide short-term, temporary relief of itch.      Avoid manipulating or irritating the area on the lip.

## 2019-01-28 NOTE — PROGRESS NOTES
[facility] - PRIMARY CARE SKIN    CC: Eczema  SUBJECTIVE:   Jacinta Robertson is a(n) 43 year old female who presents to clinic today for follow-up of eczema that started years ago. A flare of symptoms began in September 2018. Flares are described as itchy pustules on the hands.    Current treatment:   CeraVe moisturizer. She is not using glove occlusion at night.  Claritin 10 mg every day. She only took it for 2 weeks.  Hands, feet: Augmented betamethasone dipropionate 0.05% ointment  Face: OTC hydrocortisone 1% ointment   Cephalexin 500 mg BID for 14 days begun 12/31/18    Response to treatment: Symptoms on the hands cleared while on antibiotic and topical steroids. A new patch began to develop on the right ring finger with the start of her menstrual cycle; other areas developed on other fingers. Use of topical steroid for 13 days provided relief of itchiness but not full resolution. However, upon discontinuation again, symptoms on the hands have flared again. Fissures have developed at the affected areas.  Side effects of treatment noted: None.     Aggravating factors: occupational exposure with frequent handwashing. Symptoms are worse during allergy season or with dry skin, such as when blowing her nose frequently with a cold.    Products used: Shea butter moisturizer on hands. Oil free moisturizer with SPF used on the face. Sunleaf naturals bar soap used.     Previous therapies tried: triamcinolone 0.1% ointment used for 1 week. Exederm also used previously. She has tried to use gloves at work. Aquaphor previously. She uses Claritin during allergy season.    Issue Two: The spot on the mid-lower lip has not been resolving with OTC hydrocortisone 1% ointment.    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    Patient Active Problem List   Diagnosis     CARDIOVASCULAR SCREENING; LDL GOAL LESS THAN 160     Migraines     Class 1 obesity due to excess calories without serious comorbidity with body mass index (BMI) of 34.0 to 34.9 in adult       Past Medical History:   Diagnosis Date     Hx of major depression      Hypertension      Obesity (BMI 30-39.9) 3/29/2013     SHAILA (obstructive sleep apnea)     CPAP     Seasonal allergies     Past Surgical History:   Procedure Laterality Date     BUNIONECTOMY  2003    bilateral     C ORAL SURGERY PROCEDURE      wisdom teeth     ORTHOPEDIC SURGERY  2003    bilateral bunionectomy      Social History     Tobacco Use     Smoking status: Never Smoker     Smokeless tobacco: Never Used   Substance Use Topics     Alcohol use: Yes     Alcohol/week: 0.0 oz     Drug use: No    Family History     Problem (# of Occurrences) Relation (Name,Age of Onset)    Breast Cancer (1) Paternal Aunt: Cousin as well    C.A.D. (1) Mother    Hyperlipidemia (1) Father    Hypertension (1) Mother:  age 71    Lipids (1) Father           Current Outpatient Medications   Medication Sig Dispense Refill     augmented betamethasone dipropionate (DIPROLENE-AF) 0.05 % external ointment Apply topically 2 times daily to AA on hands and feet for 10-14 days. Not for use on face nor groin. 50 g 1     Borage, Borago officinalis, (BORAGE OIL PO)        Cholecalciferol (VITAMIN D-3 PO) Take 5,000 Units by mouth        Docosahexaenoic Acid (DHA PO)        Omega-3 Fatty Acids (FISH OIL) 1200 MG capsule Take 1,200 mg by mouth daily       loratadine (CLARITIN) 10 MG tablet Take 10 mg by mouth       Probiotic Product (PROBIOTIC DAILY PO)        triamcinolone (KENALOG) 0.1 % external ointment Apply topically 2 times daily (Patient not taking: Reported on 2019) 15 g 0     triamcinolone (NASACORT AQ) 55 MCG/ACT nasal inhaler Spray 2 sprays into both nostrils daily.  (Patient not taking: Reported on 1/28/2019) 3 Inhaler 3     UNABLE TO FIND MEDICATION NAME: OTC allergy eye drop         No Known Allergies     INTEGUMENTARY/SKIN: POSITIVE for pruritis and rash  ROS: 14 point review of systems was negative except the symptoms listed above in the HPI.    This document serves as a record of the services and decisions personally performed and made by Halima Bundy MD and was created by Luis Eduardo Boone, a trained medical scribe, based on personal observations and provider statements to the medical scribe.  January 28, 2019 1:55 PM   Luis Eduardo Boone    OBJECTIVE:   GENERAL: healthy, alert and no distress.  SKIN: Wei Skin Type - II.  Face, Hands examined. The dermatoscope was used to help evaluate pigmented lesions.  Skin Pertinent Findings:  Hands: Erythematous, scaly patches on right ring finger, left index finger. Some fissures on the ring finger.    ASSESSMENT:     Encounter Diagnoses   Name Primary?     Eczema, unspecified type Yes     Eczema of both hands    MDM: consider Protopic if not improving or not a sustained benefit with tapering of the topical steroid.    PLAN:   Patient Instructions   FUTURE APPOINTMENTS  Follow up in 2 week(s).    Continue applying CeraVe moisturizer cream (in the jar) regularly.    TOPICAL STEROID INSTRUCTIONS  augmented betamethasone dipropionate 0.05% ointment.  1. Wash hands before applying topical steroid.  2. Apply sparingly (just enough to rub in) onto affected areas of the hands.  (Use the adult fingertip unit (FTU) as a guide.) Apply no more than 0.25 FTU per area.      This higher strength steroid should never be used on face nor groin.    After the initial treatment, topical steroid may be used as needed for flare-ups but only for short-term treatment. If you are using this for prolonged periods of time to control flare-ups, return to clinic for re-evaluation of treatment.    Keep in mind to also regularly use moisturizer, as this  preventative measure can help maintain your skin's natural protective moisture barrier.    1. Wear socks and/or cotton gloves (can purchase at any retail pharmacy) after application of moisturizer and topical steroid nightly at bedtime and wear until the morning. Do this glove occlusion for 7 days.  2. After 7 days, decrease application of topical steroid to every other night without glove occlusion.    ORAL ANTIHISTAMINE  Take by mouth, 1 tablet(s) of OTC loratadine (Claritin) 10 mg once per day regardless if symptoms are present or not.    BLEACH BATH/SOAK INSTRUCTIONS  Bleach Bath/Soak:  A dilute bleach solution can be helpful in maintaining normal skin edu.  Frequency: 3 times a week    Perform a dilute bleach bath adding plain 6% bleach in the following amounts to warm bathwater. Do not use concentrated bleach.    Dilution amounts will be more dilute than typical swimming pools:    Full tub (approximately 40 gallons) - use 1/2 cup of bleach    Half tub - use 1/4 cup of bleach    Mixing bowl (or infants) - use 2 tbsp of bleach    Wait until water and bleach have been mixed before soaking.    Soak for 5-10 minutes.    Pat skin dry with white towels. Take care that bleach may stain towels.    Immediately after soaking, apply any topical medications to the body and to affected areas.    Follow with topical steroid and a bland emollient moisturizer or cream such as Vaseline or Aquaphor.    Never apply bleach directly to your skin.  Avoid oatmeal soaks, because they only provide short-term, temporary relief of itch.      Avoid manipulating or irritating the area on the lip.    The patient was counseled to use products free of fragrance, dyes, and plants. The importance of using bland cleansers and the regular use of heavy bland emollient creams was impressed upon the patient.    TT: 25 minutes.  CT: 20 minutes.    The information in this document, created by the medical scribe for me, accurately reflects the  services I personally performed and the decisions made by me. I have reviewed and approved this document for accuracy prior to leaving the patient care area.  January 28, 2019 1:54 PM  Halima Bundy MD  [facility] [place]

## 2019-01-28 NOTE — RESULT ENCOUNTER NOTE
Dear Jacinta,      Your recent test results are noted below:    -LDL(bad) cholesterol level is elevated, HDL(good) cholesterol level is low which can increase your heart disease risk.  A diet high in fat and simple carbohydrates, genetics and being overweight can contribute to this. ADVISE: exercising 150 minutes of aerobic exercise per week (30 minutes for 5 days per week or 50 minutes for 3 days per week are options) and eating a low saturated fat/low carbohydrate diet are helpful to improve this. We can repeat this in 1 year at your physical.   -Liver and gallbladder tests are normal (ALT,AST, Alk phos, bilirubin), kidney function is normal (Cr, GFR), sodium is normal, potassium is normal, calcium is normal, glucose is normal.  -A1C (diabetic test) is normal and indicates that your blood sugar has been in a normal range the last 3 months.  -HIV test is normal.  -Vitamin D level is normal and getting 1000 IU daily in your diet or supplements is recommended.     For additional lab test information, labtestsonline.org is an excellent reference. Please contact the clinic at [phone] with any further questions or concerns.    Sincerely,      Kristin Culp PA-C  [facility]

## 2019-01-28 NOTE — TELEPHONE ENCOUNTER
Pt calling as she went to the [facility] clinic on Saturday to do fasting labs and asked them to also draw an A1C.  She states they told her they dashawn enough blood for this and would send a message asking for the additional orders.  I do not see a message in for this.  Can we add the A1C.  Pt aware we did glucose, but also wants an A1C done.  Please advise.  Pt can be reached at [phone].  Ok to leave detailed message.  Romy Flannery

## 2019-01-28 NOTE — TELEPHONE ENCOUNTER
That's ok with me. Orders signed, please notify PL so it can be run for her.  Electronically Signed By: Kristin Culp PA-C

## 2019-02-11 ENCOUNTER — OFFICE VISIT (OUTPATIENT)
Dept: FAMILY MEDICINE | Facility: CLINIC | Age: 44
End: 2019-02-11
Payer: COMMERCIAL

## 2019-02-11 VITALS — OXYGEN SATURATION: 100 % | HEART RATE: 82 BPM | SYSTOLIC BLOOD PRESSURE: 125 MMHG | DIASTOLIC BLOOD PRESSURE: 85 MMHG

## 2019-02-11 DIAGNOSIS — L30.9 ECZEMA OF BOTH HANDS: Primary | ICD-10-CM

## 2019-02-11 PROCEDURE — 99213 OFFICE O/P EST LOW 20 MIN: CPT | Performed by: FAMILY MEDICINE

## 2019-02-11 RX ORDER — TACROLIMUS 1 MG/G
OINTMENT TOPICAL 2 TIMES DAILY PRN
Qty: 30 G | Refills: 1 | Status: SHIPPED | OUTPATIENT
Start: 2019-02-11 | End: 2020-02-11

## 2019-02-11 NOTE — LETTER
"    2/11/2019         RE: Jacinta Robertson  [address] [facility] [place] [address]        Dear Colleague,    Thank you for referring your patient, Jacinta Robertson, [facility] [place]. Please see a copy of my visit note below.    [facility] - PRIMARY CARE SKIN    CC: Eczema  SUBJECTIVE:   Jacinta Robertson is a(n) 43 year old female who presents to clinic today for follow-up of eczema that started years ago. A flare of symptoms began in September 2018. Flares have been described as itchy pustules on the hands.    Current treatment:   CeraVe moisturizer.  Hands: Augmented betamethasone dipropionate 0.05% ointment used every night from 1/28-2/4 with glove occlusion then every other night from 2/4 through today without glove occlusion.   Claritin 10 mg every day   Bleach soaks have been done 3 times a week with relief of symptoms.    Response to treatment:   Symptoms on the hands have improved. Bumps underneath calluses on the index finger have finally \"come out\". Itchiness was associated with development of symptoms. Small areas may have developed, but there is less intensity overall.  Symptoms on the hands previously cleared while on antibiotic and topical steroids. Subsequent use of topical steroid for 13 days provided relief of itchiness but not full resolution.    Side effects of treatment noted: None.    Aggravating factors: occupational exposure with frequent handwashing. Symptoms are worse during allergy season or with dry skin, such as when blowing her nose frequently with a cold.    Products used: Shea butter moisturizer on hands. Oil free moisturizer with SPF used on the face. Sunleaf naturals bar soap used.     Previous therapies tried: triamcinolone 0.1% ointment used for 1 week. Exederm also used previously. She has tried to use gloves at work. Aquaphor previously. She uses Claritin during allergy season. 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    Patient Active Problem List   Diagnosis     CARDIOVASCULAR SCREENING; LDL GOAL LESS THAN 160     Migraines     Class 1 obesity due to excess calories without serious comorbidity with body mass index (BMI) of 34.0 to 34.9 in adult       Past Medical History:   Diagnosis Date     Hx of major depression      Hypertension      Obesity (BMI 30-39.9) 3/29/2013     SHAILA (obstructive sleep apnea)     CPAP     Seasonal allergies     Past Surgical History:   Procedure Laterality Date     BUNIONECTOMY      bilateral     C ORAL SURGERY PROCEDURE      wisdom teeth     ORTHOPEDIC SURGERY  2003    bilateral bunionectomy      Social History     Tobacco Use     Smoking status: Never Smoker     Smokeless tobacco: Never Used   Substance Use Topics     Alcohol use: Yes     Alcohol/week: 0.0 oz     Drug use: No    Family History     Problem (# of Occurrences) Relation (Name,Age of Onset)    Breast Cancer (1) Paternal Aunt: Cousin as well    C.A.D. (1) Mother    Hyperlipidemia (1) Father    Hypertension (1) Mother:  age 71    Lipids (1) Father           Current Outpatient Medications   Medication Sig Dispense Refill     augmented betamethasone dipropionate (DIPROLENE-AF) 0.05 % external ointment Apply topically 2 times daily to AA on hands and feet for 10-14 days. Not for use on face nor groin. 50 g 1     Borage, Borago officinalis, (BORAGE OIL PO)        Cholecalciferol (VITAMIN D-3 PO) Take 5,000 Units by mouth        Docosahexaenoic Acid (DHA PO)        loratadine (CLARITIN) 10 MG tablet Take 10 mg by mouth       Omega-3 Fatty Acids (FISH OIL) 1200 MG capsule Take 1,200 mg by mouth daily       Probiotic Product (PROBIOTIC DAILY PO)         tacrolimus (PROTOPIC) 0.1 % external ointment Apply topically 2 times daily as needed (scaling, itchiness) 30 g 1     triamcinolone (KENALOG) 0.1 % external ointment Apply topically 2 times daily 15 g 0     triamcinolone (NASACORT AQ) 55 MCG/ACT nasal inhaler Spray 2 sprays into both nostrils daily. 3 Inhaler 3     UNABLE TO FIND MEDICATION NAME: OTC allergy eye drop         No Known Allergies     INTEGUMENTARY/SKIN: POSITIVE for pruritis and rash  ROS: 14 point review of systems was negative except the symptoms listed above in the HPI.    This document serves as a record of the services and decisions personally performed and made by Halima Bundy MD and was created by Luis Eduardo Boone, a trained medical scribe, based on personal observations and provider statements to the medical scribe.  February 11, 2019 3:21 PM   Luis Eduardo Boone    OBJECTIVE:   GENERAL: healthy, alert and no distress.  SKIN: Wei Skin Type - II.  Face, Hands examined. The dermatoscope was used to help evaluate pigmented lesions.  Skin Pertinent Findings:  Residual light scaling and erythema on left index finger, right ring finger.    ASSESSMENT:     Encounter Diagnosis   Name Primary?     Eczema of both hands Yes          PLAN:   Patient Instructions   FUTURE APPOINTMENTS  Follow up in 6 weeks or earlier as needed if symptoms worsen.    You may discontinue the bleach soaks.    Continue taking oral antihistamine Claritin as previously instructed.    TOPICAL MEDICATION INSTRUCTIONS  Tacrolimus (Protopic) 0.1% ointment.    Apply a thin layer to the affected area(s) on the hands two times per day.    Discontinue when symptoms have resolved, but you may as needed for scaling and/or itchiness.    This is not a steroid, and it can be used on a daily basis or on the face.    Keep in mind to also regularly use moisturizer, as this preventative measure can help maintain your skin's natural moisture barrier.    Apply moisturizer after application of  medication.    Your health insurance may need a prior authorization for coverage of this medication.    Although there is a black box warning for cancer risk from this medication, the study involved oral consumption by monkeys of this medication at 30 times the maximum recommended dosage.    TOPICAL STEROID INSTRUCTIONS  augmented betamethasone dipropionate 0.05% ointment.  Discontinue use at this time.    If Protopic is not improved by insurance, then apply augmented betamethasone dipropionate 0.05% ointment for 4 consecutive days followed by a 3 day pause.      The patient was counseled to use products free of fragrance, dyes, and plants. The importance of using bland cleansers and the regular use of heavy bland emollient creams was impressed upon the patient.    TT: 25 minutes.  CT: 20 minutes.    The information in this document, created by the medical scribe for me, accurately reflects the services I personally performed and the decisions made by me. I have reviewed and approved this document for accuracy prior to leaving the patient care area.  February 11, 2019 3:21 PM  Halima Bundy MD  [facility] [place]    Again, thank you for allowing me to participate in the care of your patient.        Sincerely,        Halima Bundy MD

## 2019-02-11 NOTE — PROGRESS NOTES
"[facility] - PRIMARY CARE SKIN    CC: Eczema  SUBJECTIVE:   Jacinta Robertson is a(n) 43 year old female who presents to clinic today for follow-up of eczema that started years ago. A flare of symptoms began in September 2018. Flares have been described as itchy pustules on the hands.    Current treatment:   CeraVe moisturizer.  Hands: Augmented betamethasone dipropionate 0.05% ointment used every night from 1/28-2/4 with glove occlusion then every other night from 2/4 through today without glove occlusion.   Claritin 10 mg every day   Bleach soaks have been done 3 times a week with relief of symptoms.    Response to treatment:   Symptoms on the hands have improved. Bumps underneath calluses on the index finger have finally \"come out\". Itchiness was associated with development of symptoms. Small areas may have developed, but there is less intensity overall.  Symptoms on the hands previously cleared while on antibiotic and topical steroids. Subsequent use of topical steroid for 13 days provided relief of itchiness but not full resolution.    Side effects of treatment noted: None.    Aggravating factors: occupational exposure with frequent handwashing. Symptoms are worse during allergy season or with dry skin, such as when blowing her nose frequently with a cold.    Products used: Shea butter moisturizer on hands. Oil free moisturizer with SPF used on the face. Sunleaf naturals bar soap used.     Previous therapies tried: triamcinolone 0.1% ointment used for 1 week. Exederm also used previously. She has tried to use gloves at work. Aquaphor previously. She uses Claritin during allergy season. 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    Patient Active Problem List   Diagnosis     CARDIOVASCULAR SCREENING; LDL GOAL LESS THAN 160     Migraines     Class 1 obesity due to excess calories without serious comorbidity with body mass index (BMI) of 34.0 to 34.9 in adult       Past Medical History:   Diagnosis Date     Hx of major depression      Hypertension      Obesity (BMI 30-39.9) 3/29/2013     SHAILA (obstructive sleep apnea)     CPAP     Seasonal allergies     Past Surgical History:   Procedure Laterality Date     BUNIONECTOMY  2003    bilateral     C ORAL SURGERY PROCEDURE      wisdom teeth     ORTHOPEDIC SURGERY  2003    bilateral bunionectomy      Social History     Tobacco Use     Smoking status: Never Smoker     Smokeless tobacco: Never Used   Substance Use Topics     Alcohol use: Yes     Alcohol/week: 0.0 oz     Drug use: No    Family History     Problem (# of Occurrences) Relation (Name,Age of Onset)    Breast Cancer (1) Paternal Aunt: Cousin as well    C.A.D. (1) Mother    Hyperlipidemia (1) Father    Hypertension (1) Mother:  age 71    Lipids (1) Father           Current Outpatient Medications   Medication Sig Dispense Refill     augmented betamethasone dipropionate (DIPROLENE-AF) 0.05 % external ointment Apply topically 2 times daily to AA on hands and feet for 10-14 days. Not for use on face nor groin. 50 g 1     Borage, Borago officinalis, (BORAGE OIL PO)        Cholecalciferol (VITAMIN D-3 PO) Take 5,000 Units by mouth        Docosahexaenoic Acid (DHA PO)        loratadine (CLARITIN) 10 MG tablet Take 10 mg by mouth       Omega-3 Fatty Acids (FISH OIL) 1200 MG capsule Take 1,200 mg by mouth daily       Probiotic Product (PROBIOTIC DAILY PO)        tacrolimus (PROTOPIC) 0.1 % external ointment Apply topically 2 times daily as needed (scaling, itchiness) 30 g 1     triamcinolone (KENALOG) 0.1 % external ointment Apply topically 2 times daily 15 g 0     triamcinolone (NASACORT AQ) 55 MCG/ACT nasal inhaler  Spray 2 sprays into both nostrils daily. 3 Inhaler 3     UNABLE TO FIND MEDICATION NAME: OTC allergy eye drop         No Known Allergies     INTEGUMENTARY/SKIN: POSITIVE for pruritis and rash  ROS: 14 point review of systems was negative except the symptoms listed above in the HPI.    This document serves as a record of the services and decisions personally performed and made by Halima Bundy MD and was created by Luis Eduardo Boone, a trained medical scribe, based on personal observations and provider statements to the medical scribe.  February 11, 2019 3:21 PM   Luis Eduardo Boone    OBJECTIVE:   GENERAL: healthy, alert and no distress.  SKIN: Wei Skin Type - II.  Face, Hands examined. The dermatoscope was used to help evaluate pigmented lesions.  Skin Pertinent Findings:  Residual light scaling and erythema on left index finger, right ring finger.    ASSESSMENT:     Encounter Diagnosis   Name Primary?     Eczema of both hands Yes          PLAN:   Patient Instructions   FUTURE APPOINTMENTS  Follow up in 6 weeks or earlier as needed if symptoms worsen.    You may discontinue the bleach soaks.    Continue taking oral antihistamine Claritin as previously instructed.    TOPICAL MEDICATION INSTRUCTIONS  Tacrolimus (Protopic) 0.1% ointment.    Apply a thin layer to the affected area(s) on the hands two times per day.    Discontinue when symptoms have resolved, but you may as needed for scaling and/or itchiness.    This is not a steroid, and it can be used on a daily basis or on the face.    Keep in mind to also regularly use moisturizer, as this preventative measure can help maintain your skin's natural moisture barrier.    Apply moisturizer after application of medication.    Your health insurance may need a prior authorization for coverage of this medication.    Although there is a black box warning for cancer risk from this medication, the study involved oral consumption by monkeys of this medication at 30 times the  maximum recommended dosage.    TOPICAL STEROID INSTRUCTIONS  augmented betamethasone dipropionate 0.05% ointment.  Discontinue use at this time.    If Protopic is not improved by insurance, then apply augmented betamethasone dipropionate 0.05% ointment for 4 consecutive days followed by a 3 day pause.      The patient was counseled to use products free of fragrance, dyes, and plants. The importance of using bland cleansers and the regular use of heavy bland emollient creams was impressed upon the patient.    TT: 25 minutes.  CT: 20 minutes.    The information in this document, created by the medical scribe for me, accurately reflects the services I personally performed and the decisions made by me. I have reviewed and approved this document for accuracy prior to leaving the patient care area.  February 11, 2019 3:21 PM  Halima Bundy MD  [facility] [place]

## 2019-02-11 NOTE — PATIENT INSTRUCTIONS
FUTURE APPOINTMENTS  Follow up in 6 weeks or earlier as needed if symptoms worsen.    You may discontinue the bleach soaks.    Continue taking oral antihistamine Claritin as previously instructed.    TOPICAL MEDICATION INSTRUCTIONS  Tacrolimus (Protopic) 0.1% ointment.    Apply a thin layer to the affected area(s) on the hands two times per day.    Discontinue when symptoms have resolved, but you may as needed for scaling and/or itchiness.    This is not a steroid, and it can be used on a daily basis or on the face.    Keep in mind to also regularly use moisturizer, as this preventative measure can help maintain your skin's natural moisture barrier.    Apply moisturizer after application of medication.    Your health insurance may need a prior authorization for coverage of this medication.    Although there is a black box warning for cancer risk from this medication, the study involved oral consumption by monkeys of this medication at 30 times the maximum recommended dosage.    TOPICAL STEROID INSTRUCTIONS  augmented betamethasone dipropionate 0.05% ointment.  Discontinue use at this time.    If Protopic is not improved by insurance, then apply augmented betamethasone dipropionate 0.05% ointment for 4 consecutive days followed by a 3 day pause.

## 2019-03-25 ENCOUNTER — OFFICE VISIT (OUTPATIENT)
Dept: FAMILY MEDICINE | Facility: CLINIC | Age: 44
End: 2019-03-25
Payer: COMMERCIAL

## 2019-03-25 DIAGNOSIS — L30.9 CHRONIC ECZEMA OF HAND: Primary | ICD-10-CM

## 2019-03-25 PROCEDURE — 99213 OFFICE O/P EST LOW 20 MIN: CPT | Performed by: FAMILY MEDICINE

## 2019-03-25 RX ORDER — TACROLIMUS 1 MG/G
OINTMENT TOPICAL 2 TIMES DAILY
Qty: 100 G | Refills: 0 | Status: CANCELLED | OUTPATIENT
Start: 2019-03-25 | End: 2020-03-24

## 2019-03-25 RX ORDER — TACROLIMUS 1 MG/G
OINTMENT TOPICAL 2 TIMES DAILY
Qty: 60 G | Refills: 3 | Status: SHIPPED | OUTPATIENT
Start: 2019-03-25 | End: 2020-03-24

## 2019-03-25 NOTE — PROGRESS NOTES
[facility] - PRIMARY CARE SKIN    CC: Eczema  SUBJECTIVE:   Jacinta Robertson is a(n) 43 year old female who presents to clinic today for follow-up of eczema that started years ago.     A flare of symptoms began in September 2018. Flares have been described as itchy pustules on the hands. Symptoms on the hands have improved but not fully resolved. Other fingers have become involved but overall, symptoms are not as severe. Bumps began on the thumbs approximately 2/18/19.    Current treatment:     CeraVe moisturizer and lotion. She is applying moisturizer multiple times a day. She is also wearing gloves.    Tacrolimus 0.1% ointment applied every day over the last 6 weeks.    Claritin 10 mg every day     Aggravating factors: occupational exposure with frequent handwashing. Symptoms are worse during allergy season or with dry skin, such as when blowing her nose frequently with a cold.    Previous therapies tried:   OTC Exederm  She has tried to use gloves at work. Aquaphor previously. She uses Claritin during allergy season. Cephalexin 500 mg BID for 14 days begun 12/31/18  triamcinolone 0.1% ointment  Bleach soaks have been done 3 times a week with relief of symptoms.  Augmented betamethasone dipropionate 0.05% ointment with glove occlusion.    Symptoms on the hands previously cleared while on antibiotic and topical steroids. Subsequent use of topical steroid for 13 days provided relief of itchiness but not full resolution.    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    Patient Active Problem List   Diagnosis     CARDIOVASCULAR SCREENING; LDL GOAL LESS THAN 160     Migraines     Class 1 obesity due to excess calories without serious comorbidity with body mass  index (BMI) of 34.0 to 34.9 in adult       Past Medical History:   Diagnosis Date     Hx of major depression      Hypertension      Obesity (BMI 30-39.9) 3/29/2013     SHAILA (obstructive sleep apnea)     CPAP     Seasonal allergies     Past Surgical History:   Procedure Laterality Date     BUNIONECTOMY  2003    bilateral     C ORAL SURGERY PROCEDURE      wisdom teeth     ORTHOPEDIC SURGERY  2003    bilateral bunionectomy      Social History     Tobacco Use     Smoking status: Never Smoker     Smokeless tobacco: Never Used   Substance Use Topics     Alcohol use: Yes     Alcohol/week: 0.0 oz     Drug use: No    Family History     Problem (# of Occurrences) Relation (Name,Age of Onset)    Breast Cancer (1) Paternal Aunt: Cousin as well    C.A.D. (1) Mother    Hyperlipidemia (1) Father    Hypertension (1) Mother:  age 71    Lipids (1) Father           Current Outpatient Medications   Medication Sig Dispense Refill     Borage, Borago officinalis, (BORAGE OIL PO)        Cholecalciferol (VITAMIN D-3 PO) Take 5,000 Units by mouth        Docosahexaenoic Acid (DHA PO)        loratadine (CLARITIN) 10 MG tablet Take 10 mg by mouth       Omega-3 Fatty Acids (FISH OIL) 1200 MG capsule Take 1,200 mg by mouth daily       Probiotic Product (PROBIOTIC DAILY PO)        Sharps Container (SHARPS-A-GATOR LOCKING BRACKET) MISC As directed. CPAP, heated humidifier, mask, headgear, filters and tubing. For home use. Pressure: **   Length of Need: **       tacrolimus (PROTOPIC) 0.1 % external ointment Apply topically 2 times daily Apply to AA on hands bid 60 g 3     tacrolimus (PROTOPIC) 0.1 % external ointment Apply topically 2 times daily as needed (scaling, itchiness) 30 g 1     triamcinolone (NASACORT AQ) 55 MCG/ACT nasal inhaler Spray 2 sprays into both nostrils daily. 3 Inhaler 3     augmented betamethasone dipropionate (DIPROLENE-AF) 0.05 % external ointment Apply topically 2 times daily to AA on hands and feet for 10-14 days.  Not for use on face nor groin. (Patient not taking: Reported on 3/25/2019) 50 g 1     triamcinolone (KENALOG) 0.1 % external ointment Apply topically 2 times daily (Patient not taking: Reported on 3/25/2019) 15 g 0     UNABLE TO FIND MEDICATION NAME: OTC allergy eye drop         No Known Allergies     ROS: 14 point review of systems was negative except the symptoms listed above in the HPI.    This document serves as a record of the services and decisions personally performed and made by Halima Bundy MD and was created by Luis Eduardo Boone, a trained medical scribe, based on personal observations and provider statements to the medical scribe.  March 25, 2019 1:50 PM   Luis Eduardo Boone     OBJECTIVE:   GENERAL: healthy, alert and no distress.  SKIN: Wei Skin Type - II.  Face, Hands examined. The dermatoscope was used to help evaluate pigmented lesions.  Skin Pertinent Findings:  Index finger: small residual erythematous scaly patches 3-5 mm in size  Thumbs: some fissuring and erythema and peeling of skin.    ASSESSMENT:     Encounter Diagnosis   Name Primary?     Chronic eczema of hand Yes        PLAN:   Patient Instructions   FUTURE APPOINTMENTS  Follow up in 4 months or earlier as needed.    ORAL ANTIHISTAMINE  Take by mouth, 1 tablet(s) of OTC loratadine (Claritin) 10 mg twice per day for the next 6 months.    DRY SKIN MANAGEMENT INSTRUCTIONS  Routine use of moisturizer is important for healthy, resilient skin not just for soft skin.   Continue using gloves to protect hands while washing, cooking, or cleaning.  Continue applying CeraVe moisturizer cream regularly.    TOPICAL MEDICATION INSTRUCTIONS  Tacrolimus (Protopic) 0.1% ointment.  Continue applying daily.    Restart using bleach soaks twice a week.    You may consider application of moisturizer at night with glove occlusion.      The patient was counseled to use products free of fragrance, dyes, and plants. The importance of using bland cleansers and the  regular use of heavy bland emollient creams was impressed upon the patient.    TT: 25 minutes.  CT: 20 minutes.    The information in this document, created by the medical scribe for me, accurately reflects the services I personally performed and the decisions made by me. I have reviewed and approved this document for accuracy prior to leaving the patient care area.  March 25, 2019 1:49 PM  Halima Bundy MD  [facility]

## 2019-03-25 NOTE — PATIENT INSTRUCTIONS
FUTURE APPOINTMENTS  Follow up in 4 months or earlier as needed.    ORAL ANTIHISTAMINE  Take by mouth, 1 tablet(s) of OTC loratadine (Claritin) 10 mg twice per day for the next 6 months.    DRY SKIN MANAGEMENT INSTRUCTIONS  Routine use of moisturizer is important for healthy, resilient skin not just for soft skin.   Continue using gloves to protect hands while washing, cooking, or cleaning.  Continue applying CeraVe moisturizer cream regularly.    TOPICAL MEDICATION INSTRUCTIONS  Tacrolimus (Protopic) 0.1% ointment.  Continue applying daily.    Restart using bleach soaks twice a week.    You may consider application of moisturizer at night with glove occlusion.

## 2019-03-25 NOTE — LETTER
3/25/2019         RE: Jacinta Robertson  [address] [id number]        Dear Colleague,    Thank you for referring your patient, Jacinta Robertson, to the [facility]. Please see a copy of my visit note below.    [facility] - PRIMARY CARE SKIN    CC: Eczema  SUBJECTIVE:   Jacinta Robertson is a(n) 43 year old female who presents to clinic today for follow-up of eczema that started years ago.     A flare of symptoms began in September 2018. Flares have been described as itchy pustules on the hands. Symptoms on the hands have improved but not fully resolved. Other fingers have become involved but overall, symptoms are not as severe. Bumps began on the thumbs approximately 2/18/19.    Current treatment:     CeraVe moisturizer and lotion. She is applying moisturizer multiple times a day. She is also wearing gloves.    Tacrolimus 0.1% ointment applied every day over the last 6 weeks.    Claritin 10 mg every day     Aggravating factors: occupational exposure with frequent handwashing. Symptoms are worse during allergy season or with dry skin, such as when blowing her nose frequently with a cold.    Previous therapies tried:   OTC Exederm  She has tried to use gloves at work. Aquaphor previously. She uses Claritin during allergy season. Cephalexin 500 mg BID for 14 days begun 12/31/18  triamcinolone 0.1% ointment  Bleach soaks have been done 3 times a week with relief of symptoms.  Augmented betamethasone dipropionate 0.05% ointment with glove occlusion.    Symptoms on the hands previously cleared while on antibiotic and topical steroids. Subsequent use of topical steroid for 13 days provided relief of itchiness but not full resolution.    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    Patient Active Problem List   Diagnosis     CARDIOVASCULAR SCREENING; LDL GOAL LESS THAN 160     Migraines     Class 1 obesity due to excess calories without serious comorbidity with body mass index (BMI) of 34.0 to 34.9 in adult       Past Medical History:   Diagnosis Date     Hx of major depression      Hypertension      Obesity (BMI 30-39.9) 3/29/2013     SHAILA (obstructive sleep apnea)     CPAP     Seasonal allergies     Past Surgical History:   Procedure Laterality Date     BUNIONECTOMY  2003    bilateral     C ORAL SURGERY PROCEDURE      wisdom teeth     ORTHOPEDIC SURGERY  2003    bilateral bunionectomy      Social History     Tobacco Use     Smoking status: Never Smoker     Smokeless tobacco: Never Used   Substance Use Topics     Alcohol use: Yes     Alcohol/week: 0.0 oz     Drug use: No    Family History     Problem (# of Occurrences) Relation (Name,Age of Onset)    Breast Cancer (1) Paternal Aunt: Cousin as well    C.A.D. (1) Mother    Hyperlipidemia (1) Father    Hypertension (1) Mother:  age 71    Lipids (1) Father           Current Outpatient Medications   Medication Sig Dispense Refill     Borage, Borago officinalis, (BORAGE OIL PO)        Cholecalciferol (VITAMIN D-3 PO) Take 5,000 Units by mouth        Docosahexaenoic Acid (DHA PO)        loratadine (CLARITIN) 10 MG tablet Take 10 mg by mouth       Omega-3 Fatty Acids (FISH OIL) 1200 MG capsule Take 1,200 mg by mouth daily       Probiotic Product (PROBIOTIC DAILY PO)        Sharps Container (SHARPS-A-GATOR LOCKING BRACKET) MISC As directed. CPAP, heated humidifier, mask, headgear, filters and tubing. For home use. Pressure: **   Length of Need: **       tacrolimus (PROTOPIC) 0.1 % external ointment Apply topically 2 times daily Apply to AA on hands bid 60 g 3     tacrolimus (PROTOPIC) 0.1 % external ointment Apply topically 2 times daily as needed (scaling, itchiness) 30 g  1     triamcinolone (NASACORT AQ) 55 MCG/ACT nasal inhaler Spray 2 sprays into both nostrils daily. 3 Inhaler 3     augmented betamethasone dipropionate (DIPROLENE-AF) 0.05 % external ointment Apply topically 2 times daily to AA on hands and feet for 10-14 days. Not for use on face nor groin. (Patient not taking: Reported on 3/25/2019) 50 g 1     triamcinolone (KENALOG) 0.1 % external ointment Apply topically 2 times daily (Patient not taking: Reported on 3/25/2019) 15 g 0     UNABLE TO FIND MEDICATION NAME: OTC allergy eye drop         No Known Allergies     ROS: 14 point review of systems was negative except the symptoms listed above in the HPI.    This document serves as a record of the services and decisions personally performed and made by Halima Bundy MD and was created by Luis Eduardo Boone, a trained medical scribe, based on personal observations and provider statements to the medical scribe.  March 25, 2019 1:50 PM   Luis Eduardo Boone     OBJECTIVE:   GENERAL: healthy, alert and no distress.  SKIN: Wei Skin Type - II.  Face, Hands examined. The dermatoscope was used to help evaluate pigmented lesions.  Skin Pertinent Findings:  Index finger: small residual erythematous scaly patches 3-5 mm in size  Thumbs: some fissuring and erythema and peeling of skin.    ASSESSMENT:     Encounter Diagnosis   Name Primary?     Chronic eczema of hand Yes        PLAN:   Patient Instructions   FUTURE APPOINTMENTS  Follow up in 4 months or earlier as needed.    ORAL ANTIHISTAMINE  Take by mouth, 1 tablet(s) of OTC loratadine (Claritin) 10 mg twice per day for the next 6 months.    DRY SKIN MANAGEMENT INSTRUCTIONS  Routine use of moisturizer is important for healthy, resilient skin not just for soft skin.   Continue using gloves to protect hands while washing, cooking, or cleaning.  Continue applying CeraVe moisturizer cream regularly.    TOPICAL MEDICATION INSTRUCTIONS  Tacrolimus (Protopic) 0.1% ointment.  Continue applying  daily.    Restart using bleach soaks twice a week.    You may consider application of moisturizer at night with glove occlusion.      The patient was counseled to use products free of fragrance, dyes, and plants. The importance of using bland cleansers and the regular use of heavy bland emollient creams was impressed upon the patient.    TT: 25 minutes.  CT: 20 minutes.    The information in this document, created by the medical scribe for me, accurately reflects the services I personally performed and the decisions made by me. I have reviewed and approved this document for accuracy prior to leaving the patient care area.  March 25, 2019 1:49 PM  Halima Bundy MD  [facility]    Again, thank you for allowing me to participate in the care of your patient.        Sincerely,        Halima Bundy MD

## 2019-04-17 ENCOUNTER — HOSPITAL ENCOUNTER (OUTPATIENT)
Dept: MAMMOGRAPHY | Facility: CLINIC | Age: 44
Discharge: HOME | End: 2019-04-17
Attending: OBSTETRICS & GYNECOLOGY | Admitting: OBSTETRICS & GYNECOLOGY
Payer: COMMERCIAL

## 2019-04-17 DIAGNOSIS — Z12.31 VISIT FOR SCREENING MAMMOGRAM: ICD-10-CM

## 2019-04-17 PROCEDURE — 77067 SCR MAMMO BI INCL CAD: CPT

## 2019-05-29 ENCOUNTER — TELEPHONE (OUTPATIENT)
Dept: FAMILY MEDICINE | Facility: CLINIC | Age: 44
End: 2019-05-29

## 2019-05-29 DIAGNOSIS — L30.9 HAND ECZEMA: Primary | ICD-10-CM

## 2019-05-29 RX ORDER — PREDNISONE 20 MG/1
TABLET ORAL
Qty: 15 TABLET | Refills: 0 | Status: SHIPPED | OUTPATIENT
Start: 2019-05-29 | End: 2021-01-13

## 2019-05-29 NOTE — TELEPHONE ENCOUNTER
Prednisone taper 40 mg q day X 3 days, 30 mg q days X 3 days, 20 mg q day X 3 days then 10 mg per day for 3 days.    Definitely keep appointment June 17 2019, can discuss possible other alternatives then.     Thank you,   Halima Bundy M.D.

## 2019-05-29 NOTE — TELEPHONE ENCOUNTER
Called patient:  Affected areas: bilateral thumbs- left index finger- right ring finger- right middle finger  Bleeding/cracking/ peeling/redness/painful/itching- right thumb is very painful/swollen and red  Still having increasing symptoms- symptoms have been on and off- but really flared this weekend  Menses started this weekend and this seems to coincide with flares  Using cream as directed(protpic)  Claritin  BID  Nasacort 2 sprays each nostril daily  Bleach baths every other day  Wearing gloves with Moistuizer during showers/dishes/work and at night- using moisturizer multiple times a day    Symptoms have not improved with this treatment regime    Skin clinic is booked until the 17th- was told not to let this go that long due to possibility of infection-    Pharmacy pended    Please advise    Lynnette Saravia RN BSN  [facility]  [phone]

## 2019-05-29 NOTE — TELEPHONE ENCOUNTER
Patient notified of  providers message, patient has no further questions.  Advised to call if oozing/odor/heat increasing pain- signs of infection    Lynnette ARMENTARN BSN  [facility]  [phone]

## 2019-05-29 NOTE — TELEPHONE ENCOUNTER
Reason for call:  Patient reporting a symptom    Symptom or request: The patient is calling saying her eczema is worsening. She made an appointment to see Dr. Bundy on 06/17/2019 but is wondering if Dr. Bundy wants to prescribe her something or see her sooner. She would like a call back.    Phone Number patient can be reached at:  Cell number on file:    Telephone Information:   Mobile [phone]     Best Time:  Anytime    Can we leave a detailed message on this number:  YES    Call taken on 5/29/2019 at 2:43 PM by Stephanie Mccormack

## 2019-06-17 ENCOUNTER — OFFICE VISIT (OUTPATIENT)
Dept: FAMILY MEDICINE | Facility: CLINIC | Age: 44
End: 2019-06-17
Payer: COMMERCIAL

## 2019-06-17 VITALS — SYSTOLIC BLOOD PRESSURE: 134 MMHG | DIASTOLIC BLOOD PRESSURE: 82 MMHG

## 2019-06-17 DIAGNOSIS — L30.9 CHRONIC ECZEMA OF HAND: Primary | ICD-10-CM

## 2019-06-17 PROCEDURE — 99213 OFFICE O/P EST LOW 20 MIN: CPT | Performed by: FAMILY MEDICINE

## 2019-06-17 NOTE — LETTER
"    6/17/2019         RE: Jacinta Robertson  [address] [facility] [address]        Dear Colleague,    Thank you for referring your patient, Jacinta Robertson, to the [facility]. Please see a copy of my visit note below.    [facility] - PRIMARY CARE SKIN    CC: Eczema  SUBJECTIVE:   Jacinta Robertson is a(n) 44 year old female who presents to clinic today for follow-up of eczema that started years ago.  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    A flare of symptoms began in September 2018. Flares have been described as itchy pustules on the hands. Symptoms were aggravated by occupational activities. Patches on the thumbs of skin cracking continued to develop from March 2019 through May 2019 even with precautionary measures. She has decided to change jobs.    A spot on the left forearm developed after contact with a rag at work (unclear what chemicals or materials were on the rag), even while on prednisone.    The \"fire\" of rash and pain at affected areas diminished while on prednisone. After finishing prednisone, patches of rash began to recur on the fingers. Itchiness has not been intense. Symptoms are mostly manageable at this time, although some fissures are noted.    Current treatment:     CeraVe moisturizer with glove occlusion at night. Vaseline also used.    Claritin 10 mg twice daily previously, now 10 mg once daily in the last 1 week.    Nasacort 2 sprays each nostril once daily    Bleach soaks every other day    Gloves while in water contact    Prednisone 40 mg 12 day taper added 5/29/19    Tacrolimus 0.1% ointment - it has been ineffective    Side effects: increased energy while taking prednisone    Aggravating factors: occupational exposure with frequent handwashing. Symptoms are worse during allergy season or with dry skin, such as when blowing her nose frequently with a cold.  Symptom flare coincided with " menses.    Previous therapies tried:   OTC Exederm  She has tried to use gloves at work. Aquaphor previously. She uses Claritin during allergy season. Cephalexin 500 mg BID for 14 days begun 18  triamcinolone 0.1% ointment  Bleach soaks have been done 3 times a week with relief of symptoms.  Augmented betamethasone dipropionate 0.05% ointment with glove occlusion.    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    Patient Active Problem List   Diagnosis     CARDIOVASCULAR SCREENING; LDL GOAL LESS THAN 160     Migraines     Class 1 obesity due to excess calories without serious comorbidity with body mass index (BMI) of 34.0 to 34.9 in adult       Past Medical History:   Diagnosis Date     Hx of major depression      Hypertension      Obesity (BMI 30-39.9) 3/29/2013     SHAILA (obstructive sleep apnea)     CPAP     Seasonal allergies     Past Surgical History:   Procedure Laterality Date     BUNIONECTOMY  2003    bilateral     C ORAL SURGERY PROCEDURE      wisdom teeth     ORTHOPEDIC SURGERY  2003    bilateral bunionectomy      Social History     Tobacco Use     Smoking status: Never Smoker     Smokeless tobacco: Never Used   Substance Use Topics     Alcohol use: Yes     Alcohol/week: 0.0 oz     Drug use: No    Family History     Problem (# of Occurrences) Relation (Name,Age of Onset)    Breast Cancer (1) Paternal Aunt: Cousin as well    C.A.D. (1) Mother    Hyperlipidemia (1) Father    Hypertension (1) Mother:  age 71    Lipids (1) Father           Current Outpatient Medications   Medication Sig Dispense Refill     Borage, Borago officinalis, (BORAGE OIL PO)        Cholecalciferol (VITAMIN D-3 PO) Take 5,000 Units by mouth        Docosahexaenoic Acid (DHA  PO)        loratadine (CLARITIN) 10 MG tablet Take 10 mg by mouth       Omega-3 Fatty Acids (FISH OIL) 1200 MG capsule Take 1,200 mg by mouth daily       Probiotic Product (PROBIOTIC DAILY PO)        Sharps Container (SHARPS-A-GATOR LOCKING BRACKET) MISC As directed. CPAP, heated humidifier, mask, headgear, filters and tubing. For home use. Pressure: **   Length of Need: **       tacrolimus (PROTOPIC) 0.1 % external ointment Apply topically 2 times daily Apply to AA on hands bid 60 g 3     tacrolimus (PROTOPIC) 0.1 % external ointment Apply topically 2 times daily as needed (scaling, itchiness) 30 g 1     triamcinolone (KENALOG) 0.1 % external ointment Apply topically 2 times daily 15 g 0     triamcinolone (NASACORT AQ) 55 MCG/ACT nasal inhaler Spray 2 sprays into both nostrils daily. 3 Inhaler 3     UNABLE TO FIND MEDICATION NAME: OTC allergy eye drop       augmented betamethasone dipropionate (DIPROLENE-AF) 0.05 % external ointment Apply topically 2 times daily to AA on hands and feet for 10-14 days. Not for use on face nor groin. (Patient not taking: Reported on 6/17/2019) 50 g 1     predniSONE (DELTASONE) 20 MG tablet 2 tabs po q day times 3 days , 1.5 tabs po q day times 3 days, 1 tab po q day times 3 days 1/2 tab q day times 3 days (Patient not taking: Reported on 6/17/2019) 15 tablet 0       No Known Allergies     ROS: 14 point review of systems was negative except the symptoms listed above in the HPI.    This document serves as a record of the services and decisions personally performed and made by Halima Bundy MD and was created by Luis Eduardo Boone, a trained medical scribe, based on personal observations and provider statements to the medical scribe.  June 17, 2019 10:41 AM   Luis Eduardo Boone    OBJECTIVE:   GENERAL: healthy, alert and no distress.  SKIN: Wei Skin Type - II.  Face, Hands examined. The dermatoscope was used to help evaluate pigmented lesions.  Skin Pertinent Findings:  Desquamation and  erythema of skin on thumbs, index finger, middle and ring finger of varying degrees. Some scaling and erythema on side of index fingers     ASSESSMENT:     Encounter Diagnosis   Name Primary?     Chronic eczema of hand Yes          PLAN:   Patient Instructions   FUTURE APPOINTMENTS  Follow up in 4 week(s) if symptoms are not improving.    Topical steroid  Augmented betamethasone dipropionate 0.05% ointment  Use twice daily for 2 weeks.    Continue applying moisturizer regularly.    Discontinue use of tacrolimus 0.1% ointment.    Continue taking Claritin 10 mg once daily    Discontinue Nasacort.    Continue bleach soaks once every other night for a week. Then decrease frequency.    TT: 20 minutes.  CT: 15 minutes.    The information in this document, created by the medical scribe for me, accurately reflects the services I personally performed and the decisions made by me. I have reviewed and approved this document for accuracy prior to leaving the patient care area.  June 17, 2019 10:40 AM  Halima Bundy MD  [facility] [place]    Again, thank you for allowing me to participate in the care of your patient.        Sincerely,        Halima Bundy MD

## 2019-06-17 NOTE — PROGRESS NOTES
"[facility] - PRIMARY CARE SKIN    CC: Eczema  SUBJECTIVE:   Jacinta Robertson is a(n) 44 year old female who presents to clinic today for follow-up of eczema that started years ago.  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    A flare of symptoms began in September 2018. Flares have been described as itchy pustules on the hands. Symptoms were aggravated by occupational activities. Patches on the thumbs of skin cracking continued to develop from March 2019 through May 2019 even with precautionary measures. She has decided to change jobs.    A spot on the left forearm developed after contact with a rag at work (unclear what chemicals or materials were on the rag), even while on prednisone.    The \"fire\" of rash and pain at affected areas diminished while on prednisone. After finishing prednisone, patches of rash began to recur on the fingers. Itchiness has not been intense. Symptoms are mostly manageable at this time, although some fissures are noted.    Current treatment:     CeraVe moisturizer with glove occlusion at night. Vaseline also used.    Claritin 10 mg twice daily previously, now 10 mg once daily in the last 1 week.    Nasacort 2 sprays each nostril once daily    Bleach soaks every other day    Gloves while in water contact    Prednisone 40 mg 12 day taper added 5/29/19    Tacrolimus 0.1% ointment - it has been ineffective    Side effects: increased energy while taking prednisone    Aggravating factors: occupational exposure with frequent handwashing. Symptoms are worse during allergy season or with dry skin, such as when blowing her nose frequently with a cold.  Symptom flare coincided with menses.    Previous therapies tried:   OTC Exederm  She has tried to use gloves at work. Aquaphor previously. She uses Claritin during allergy season. Cephalexin 500 mg BID for 14 days begun 12/31/18  triamcinolone 0.1% ointment  Bleach soaks have been done 3 times a week " with relief of symptoms.  Augmented betamethasone dipropionate 0.05% ointment with glove occlusion.    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    Patient Active Problem List   Diagnosis     CARDIOVASCULAR SCREENING; LDL GOAL LESS THAN 160     Migraines     Class 1 obesity due to excess calories without serious comorbidity with body mass index (BMI) of 34.0 to 34.9 in adult       Past Medical History:   Diagnosis Date     Hx of major depression      Hypertension      Obesity (BMI 30-39.9) 3/29/2013     SHAILA (obstructive sleep apnea)     CPAP     Seasonal allergies     Past Surgical History:   Procedure Laterality Date     BUNIONECTOMY  2003    bilateral     C ORAL SURGERY PROCEDURE      wisdom teeth     ORTHOPEDIC SURGERY  2003    bilateral bunionectomy      Social History     Tobacco Use     Smoking status: Never Smoker     Smokeless tobacco: Never Used   Substance Use Topics     Alcohol use: Yes     Alcohol/week: 0.0 oz     Drug use: No    Family History     Problem (# of Occurrences) Relation (Name,Age of Onset)    Breast Cancer (1) Paternal Aunt: Cousin as well    C.A.D. (1) Mother    Hyperlipidemia (1) Father    Hypertension (1) Mother:  age 71    Lipids (1) Father           Current Outpatient Medications   Medication Sig Dispense Refill     Borage, Borago officinalis, (BORAGE OIL PO)        Cholecalciferol (VITAMIN D-3 PO) Take 5,000 Units by mouth        Docosahexaenoic Acid (DHA PO)        loratadine (CLARITIN) 10 MG tablet Take 10 mg by mouth       Omega-3 Fatty Acids (FISH OIL) 1200 MG capsule Take 1,200 mg by mouth daily       Probiotic Product (PROBIOTIC DAILY PO)        Sharps Container (SHARPS-A-GATOR LOCKING BRACKET) MISC As directed.  CPAP, heated humidifier, mask, headgear, filters and tubing. For home use. Pressure: **   Length of Need: **       tacrolimus (PROTOPIC) 0.1 % external ointment Apply topically 2 times daily Apply to AA on hands bid 60 g 3     tacrolimus (PROTOPIC) 0.1 % external ointment Apply topically 2 times daily as needed (scaling, itchiness) 30 g 1     triamcinolone (KENALOG) 0.1 % external ointment Apply topically 2 times daily 15 g 0     triamcinolone (NASACORT AQ) 55 MCG/ACT nasal inhaler Spray 2 sprays into both nostrils daily. 3 Inhaler 3     UNABLE TO FIND MEDICATION NAME: OTC allergy eye drop       augmented betamethasone dipropionate (DIPROLENE-AF) 0.05 % external ointment Apply topically 2 times daily to AA on hands and feet for 10-14 days. Not for use on face nor groin. (Patient not taking: Reported on 6/17/2019) 50 g 1     predniSONE (DELTASONE) 20 MG tablet 2 tabs po q day times 3 days , 1.5 tabs po q day times 3 days, 1 tab po q day times 3 days 1/2 tab q day times 3 days (Patient not taking: Reported on 6/17/2019) 15 tablet 0       No Known Allergies     ROS: 14 point review of systems was negative except the symptoms listed above in the HPI.    This document serves as a record of the services and decisions personally performed and made by Halima Bundy MD and was created by Luis Eduardo Boone, a trained medical scribe, based on personal observations and provider statements to the medical scribe.  June 17, 2019 10:41 AM   Luis Eduardo Boone    OBJECTIVE:   GENERAL: healthy, alert and no distress.  SKIN: Wei Skin Type - II.  Face, Hands examined. The dermatoscope was used to help evaluate pigmented lesions.  Skin Pertinent Findings:  Desquamation and erythema of skin on thumbs, index finger, middle and ring finger of varying degrees. Some scaling and erythema on side of index fingers     ASSESSMENT:     Encounter Diagnosis   Name Primary?     Chronic eczema of hand Yes          PLAN:   Patient Instructions   FUTURE  APPOINTMENTS  Follow up in 4 week(s) if symptoms are not improving.    Topical steroid  Augmented betamethasone dipropionate 0.05% ointment  Use twice daily for 2 weeks.    Continue applying moisturizer regularly.    Discontinue use of tacrolimus 0.1% ointment.    Continue taking Claritin 10 mg once daily    Discontinue Nasacort.    Continue bleach soaks once every other night for a week. Then decrease frequency.    TT: 20 minutes.  CT: 15 minutes.    The information in this document, created by the medical scribe for me, accurately reflects the services I personally performed and the decisions made by me. I have reviewed and approved this document for accuracy prior to leaving the patient care area.  June 17, 2019 10:40 AM  Halima Bundy MD  [facility] [place]

## 2019-11-07 ENCOUNTER — HEALTH MAINTENANCE LETTER (OUTPATIENT)
Age: 44
End: 2019-11-07

## 2019-12-02 ENCOUNTER — E-VISIT (OUTPATIENT)
Dept: FAMILY MEDICINE | Facility: CLINIC | Age: 44
End: 2019-12-02
Payer: COMMERCIAL

## 2019-12-02 ENCOUNTER — TELEPHONE (OUTPATIENT)
Dept: FAMILY MEDICINE | Facility: CLINIC | Age: 44
End: 2019-12-02

## 2019-12-02 DIAGNOSIS — T75.3XXA MOTION SICKNESS, INITIAL ENCOUNTER: Primary | ICD-10-CM

## 2019-12-02 PROCEDURE — 99444 ZZC PHYSICIAN ONLINE EVALUATION & MANAGEMENT SERVICE: CPT | Performed by: PHYSICIAN ASSISTANT

## 2019-12-02 RX ORDER — SCOLOPAMINE TRANSDERMAL SYSTEM 1 MG/1
1 PATCH, EXTENDED RELEASE TRANSDERMAL
Qty: 2 PATCH | Refills: 0 | Status: SHIPPED | OUTPATIENT
Start: 2019-12-02 | End: 2021-01-13

## 2020-02-20 ENCOUNTER — TRANSFERRED RECORDS (OUTPATIENT)
Dept: HEALTH INFORMATION MANAGEMENT | Facility: CLINIC | Age: 45
End: 2020-02-20

## 2020-02-20 LAB
ALT SERPL-CCNC: 17 IU/L (ref 0–32)
AST SERPL-CCNC: 20 IU/L (ref 0–40)
CHOLEST SERPL-MCNC: 208 MG/DL (ref 100–199)
CREAT SERPL-MCNC: 0.87 MG/DL (ref 0.57–1)
GFR SERPL CREATININE-BSD FRML MDRD: 81 ML/MIN/1.73
GLUCOSE SERPL-MCNC: 94 MG/DL (ref 65–99)
HBA1C MFR BLD: 5.5 % (ref 4.8–5.6)
HDLC SERPL-MCNC: 48 MG/DL
HPV ABSTRACT: NORMAL
LDLC SERPL CALC-MCNC: 146 MG/DL (ref 0–99)
PAP-ABSTRACT: NORMAL
POTASSIUM SERPL-SCNC: 5.1 MMOL/L (ref 3.5–5.2)
TRIGL SERPL-MCNC: 71 MG/DL (ref 0–149)
TSH SERPL-ACNC: 1.51 ULU/ML (ref 0.45–4.5)

## 2020-07-07 ENCOUNTER — HOSPITAL ENCOUNTER (OUTPATIENT)
Dept: MAMMOGRAPHY | Facility: CLINIC | Age: 45
Discharge: HOME | End: 2020-07-07
Attending: OBSTETRICS & GYNECOLOGY | Admitting: OBSTETRICS & GYNECOLOGY
Payer: COMMERCIAL

## 2020-07-07 DIAGNOSIS — Z12.31 VISIT FOR SCREENING MAMMOGRAM: ICD-10-CM

## 2020-07-07 PROCEDURE — 77067 SCR MAMMO BI INCL CAD: CPT

## 2020-07-19 ENCOUNTER — OFFICE VISIT (OUTPATIENT)
Dept: URGENT CARE | Facility: URGENT CARE | Age: 45
End: 2020-07-19
Payer: COMMERCIAL

## 2020-07-19 VITALS
BODY MASS INDEX: 35.94 KG/M2 | HEART RATE: 99 BPM | OXYGEN SATURATION: 96 % | TEMPERATURE: 98.2 F | SYSTOLIC BLOOD PRESSURE: 142 MMHG | RESPIRATION RATE: 19 BRPM | WEIGHT: 216 LBS | DIASTOLIC BLOOD PRESSURE: 96 MMHG

## 2020-07-19 DIAGNOSIS — L30.9 ACUTE ECZEMA: ICD-10-CM

## 2020-07-19 DIAGNOSIS — L30.9 ECZEMA, UNSPECIFIED TYPE: Primary | ICD-10-CM

## 2020-07-19 DIAGNOSIS — R68.83 CHILLS: ICD-10-CM

## 2020-07-19 LAB
BASOPHILS # BLD AUTO: 0.1 10E9/L (ref 0–0.2)
BASOPHILS NFR BLD AUTO: 0.9 %
DIFFERENTIAL METHOD BLD: NORMAL
EOSINOPHIL # BLD AUTO: 0.5 10E9/L (ref 0–0.7)
EOSINOPHIL NFR BLD AUTO: 5.8 %
ERYTHROCYTE [DISTWIDTH] IN BLOOD BY AUTOMATED COUNT: 13 % (ref 10–15)
HCT VFR BLD AUTO: 41 % (ref 35–47)
HGB BLD-MCNC: 13.9 G/DL (ref 11.7–15.7)
LYMPHOCYTES # BLD AUTO: 1.8 10E9/L (ref 0.8–5.3)
LYMPHOCYTES NFR BLD AUTO: 21.3 %
MCH RBC QN AUTO: 29.9 PG (ref 26.5–33)
MCHC RBC AUTO-ENTMCNC: 33.9 G/DL (ref 31.5–36.5)
MCV RBC AUTO: 88 FL (ref 78–100)
MONOCYTES # BLD AUTO: 0.8 10E9/L (ref 0–1.3)
MONOCYTES NFR BLD AUTO: 9.8 %
NEUTROPHILS # BLD AUTO: 5.3 10E9/L (ref 1.6–8.3)
NEUTROPHILS NFR BLD AUTO: 62.2 %
PLATELET # BLD AUTO: 339 10E9/L (ref 150–450)
RBC # BLD AUTO: 4.65 10E12/L (ref 3.8–5.2)
WBC # BLD AUTO: 8.5 10E9/L (ref 4–11)

## 2020-07-19 PROCEDURE — 99214 OFFICE O/P EST MOD 30 MIN: CPT | Performed by: FAMILY MEDICINE

## 2020-07-19 PROCEDURE — 85025 COMPLETE CBC W/AUTO DIFF WBC: CPT | Performed by: FAMILY MEDICINE

## 2020-07-19 PROCEDURE — 36415 COLL VENOUS BLD VENIPUNCTURE: CPT | Performed by: FAMILY MEDICINE

## 2020-07-19 RX ORDER — METHYLPREDNISOLONE 4 MG
TABLET, DOSE PACK ORAL
Qty: 21 TABLET | Refills: 0 | Status: SHIPPED | OUTPATIENT
Start: 2020-07-19 | End: 2021-01-13

## 2020-07-19 RX ORDER — CEPHALEXIN 500 MG/1
500 CAPSULE ORAL 3 TIMES DAILY
Qty: 30 CAPSULE | Refills: 0 | Status: SHIPPED | OUTPATIENT
Start: 2020-07-19 | End: 2020-07-29

## 2020-07-19 NOTE — PATIENT INSTRUCTIONS
Patient Education     Managing Atopic Dermatitis (Eczema)     After bathing, gently pat your skin dry (don t rub). Apply moisturizer while your skin is still damp.   To manage your symptoms and help reduce the severity and frequency, try these self-care tips:  Caring for your skin    Use a gentle, fragrance-free cleanser (or nonsoap cleanser) for bathing. Rinse well. Pat skin dry.    Take warm, not hot, baths or showers. Try to limit them to no more that 10 to 15 minutes.     Use moisturizer liberally right after you bathe, while your skin is still damp.    Avoid scratching because it will cause more damage to your skin.     Topical, over-the-counter hydrocortisone cream may help control mild symptoms.   Controlling your environment    Avoid extreme heat or cold.    Avoid very humid or very dry air.    If your home or office air is very dry, use a humidifier.    Avoid allergens, such as dust, that may be present in bedding, carpets, plush toys, or rugs.    Know that pet hair and dander can cause flare-ups.  Seeking medical treatment  Another way to keep symptoms under control is to seek medical treatment. Talk with your healthcare provider about the type of treatment that may work best for you. Your provider may prescribe treatments such as the following:    Topical treatments to put on the skin daily    Medicines taken by mouth (oral medicines), such as antihistamines, antibiotics, or corticosteroids    In severe cases shots (injections) may be needed to control the symptoms. You may even need antibiotics if skin infections occur.  Treatments don t work the same way for every person. So if your symptoms continue or get worse, ask your healthcare provider about other treatments.  Making lifestyle choices    Manage the stress in your life.    Wear loose-fitting cotton clothing that does not bind or rub your skin.    Avoid contact with wool or other scratchy fabrics.    Use fragrance-free products.  Getting good  "results  Now that you know more about atopic dermatitis, the next step is up to you. Follow your healthcare provider s treatment plan and your self-care routine. This will help bring atopic dermatitis under control. If your symptoms persist, be sure to let your health care provider know.   Date Last Reviewed: 2/1/2017 2000-2019 [employer]. [address] [place] [address], [place] [address]. All rights reserved. This information is not intended as a substitute for professional medical care. Always follow your healthcare professional's instructions.           Patient Education     Gentle Skin Care  For Babies and Children  Gentle skin care starts with good bathing and keeping the skin moist. Gentle skin care helps babies and children with sensitive skin and eczema. It also helps with long-lasting (chronic) dry skin.  Skin care products  Here are some gentle skin care products you may want to try.* You can try other brands too. Generic and store brands are OK as well. Just make sure everything is fragrance free.  Mild cleansers (instead of soap):    Aquaphor 2 in1 Gentle Wash and Shampoo    CeraVe    Cetaphil Gentle Cleanser (Stay away from Cetaphil's \"baby\" line because it has fragrance.)    Dove Fragrance Free Bar    Vanicream Cleansing Bar  Shampoos and conditioners:    Aquaphor 2 in 1 Gentle Wash and Shampoo    [place] Baby \"Super Sensitive\" Shampoo    Free and Clear by Vanicream  Moisturizers:    Creams: Cetaphil cream, CeraVe cream, Eucerin cream, and Vanicream    Ointments: Aquaphor Ointment, Vaseline, petroleum jelly, and Vaniply  Don't use lotion: It's too thin for eczema. It can also have alcohol, which irritates the skin. Ointments and creams work better.  Oils:    Mineral oil    Coconut and sunflower seed oil work for some children.  Sunblock:     Use sunscreens that have zinc oxide or titanium dioxide. These block the sun.    Make sure the sunblock has SPF 30 or more.    Don't use spray cans " "(aerosols) or \"chemical\" sunscreens if you can avoid them.  Laundry products:    All Free and Clear    Cheer Free    Dreft    Tide Free    Generic Brands are OK as long as they are \"Fragrance Free.\"    Don't use fabric softeners or dryer sheets.  Stay away from these products    Don't use products that have added fragrance.    \"Organic\" does not mean \"fragrance free.\" In fact, some organic products have plant parts that can irritate sensitive skin.    Many \"baby\" products have added fragrance that may bother your child's skin.  Skin care tips  1. Daily bathing in a tub bath is best to soak the skin and get clean.  2. Use lukewarm water.  3. Keep bathing and showering short--less than 15 minutes.  4. When you wash, focus on the skin folds, face and feet.  5. After bathing, pat the skin lightly with a towel. Don't rub or scrub when drying.  6. Put on moisturizer right away after the bath.  7. If the doctor prescribed medicine to put on the skin, put the medicine on first. Then put on the moisturizer.  8. Use moisturizing creams at least 2 times a day on the whole body. For example, in the morning and before bed. Your provider may suggest using a lighter or heavier cream based on your child's skin and the time of year.  \"Do's\" and \"Don'ts\"  Do    Bathe in a tub rather than shower whenever you can.    Wash new clothes before your child wears them for the first time.    Put on moisturizing creams or ointments at least twice daily to the whole body.  Don't    Don't use bubble bath.    Don't scrub hard when cleaning the skin.    Don't use skin lotion instead of cream. Lotions don't work as well.    Don't use products like powders, perfumes or colognes.    Don't dress your child in \"scratchy\" clothes, like wool.  *We don't endorse any specific product or brand. The products listed here are just examples.  Prepared by the [facility] Division of Pediatric Dermatology. For informational purposes only. Not to " replace the advice of your health care provider. Copyright   2017 [facility] [employer]. All rights reserved. W[employer] [id number] - 4/17.  For informational purposes only. Not to replace the advice of your health care provider.  Copyright   2018 [employer]. All rights reserved.

## 2020-07-19 NOTE — PROGRESS NOTES
Chief Complaint   Patient presents with     Urgent Care     Derm Problem     Possible eczema flare. Had been spreading over the last year, started from blateral palm and had been spreading on face, neck and face. Sx- red skin, severe dryness, itching on neck line, fatigue, swollen skin, chills, weeping skin. Had been seeing dermatologist and dietician       SUBJECTIVE:  Jacinta Robertson with h/o depression ,htn,obesity , h/o eczema  is a 45 year old female who presents to the clinic today for a rash.  Onset of rash was 1 week(s) ago. Worsened   Rash is worsening.  Location of the rash: face and neck chest forearm , arm .  Quality/symptoms of rash: itching, burning and weeping    Symptoms are moderate and rash seems to be worsening.  Previous history of a similar rash? Yes:   Recent exposure history: none known  She is following a good diet and is on several supplements which she has been taking for a while   Associated symptoms include: chills just yesterday none now   Has no uti symptoms or uri symptoms .  Currently she is using cerave and cetaphil    Past Medical History:   Diagnosis Date     Hx of major depression      Hypertension      Obesity (BMI 30-39.9) 3/29/2013     SHAILA (obstructive sleep apnea)     CPAP     Seasonal allergies      Current Outpatient Medications   Medication Sig Dispense Refill     Borage, Borago officinalis, (BORAGE OIL PO)        Cholecalciferol (VITAMIN D-3 PO) Take 5,000 Units by mouth        Docosahexaenoic Acid (DHA PO)        loratadine (CLARITIN) 10 MG tablet Take 10 mg by mouth       Omega-3 Fatty Acids (FISH OIL) 1200 MG capsule Take 1,200 mg by mouth daily       predniSONE (DELTASONE) 20 MG tablet 2 tabs po q day times 3 days , 1.5 tabs po q day times 3 days, 1 tab po q day times 3 days 1/2 tab q day times 3 days (Patient not taking: Reported on 6/17/2019) 15 tablet 0     Probiotic Product (PROBIOTIC DAILY PO)        scopolamine (TRANSDERM) 1 MG/3DAYS 72 hr patch Place 1 patch  onto the skin every 72 hours 2 patch 0     Sharps Container (SHARPS-A-GATOR LOCKING BRACKET) MISC As directed. CPAP, heated humidifier, mask, headgear, filters and tubing. For home use. Pressure: **   Length of Need: **       triamcinolone (NASACORT AQ) 55 MCG/ACT nasal inhaler Spray 2 sprays into both nostrils daily. 3 Inhaler 3     UNABLE TO FIND MEDICATION NAME: OTC allergy eye drop       Social History     Tobacco Use     Smoking status: Never Smoker     Smokeless tobacco: Never Used   Substance Use Topics     Alcohol use: Yes     Alcohol/week: 0.0 standard drinks       ROS:  10 point ROS of systems including Eyes, Respiratory, Cardiovascular, Gastroenterology, Genitourinary, Muscularskeletal, neurological Psychiatric were all negative except for pertinent positives noted in my HPI           EXAM:   Wt 98 kg (216 lb)   BMI (P) 35.94 kg/m    GENERAL: alert, no acute distress.  SKIN: Rash description:    Distribution: generalized  Location: face and neck chest forearm , arm .hands   Color: red and skin thickened ,  Lesion type: maculopapular, blotchy, confluent, exposed areas with inflammation and excoriation,                            GENERAL APPEARANCE: healthy, alert and no distress  EYES: EOMI,  PERRL, conjunctiva clear  HENT: ear canals and TM's normal.  Nose and mouth without ulcers, erythema or lesions  NECK: no adenopathy  RESP: lungs clear to auscultation - no rales, rhonchi or wheezes  CV: regular rates and rhythm, normal S1 S2, no murmur noted  MS:  extremities normal- no gross deformities noted, no erythema, FROM noted in all extremities  NEURO: Normal strength and tone, sensory exam grossly normal,  normal speech and mentation  PSYCH: mentation appears normal    D/d- diffuse eczema /cellulitis , allergic reaction, atopic dermatitis    ASSESSMENT:  Jacinta was seen today for urgent care and derm problem.    Diagnoses and all orders for this visit:    Eczema, unspecified type  -     CBC with  platelets and differential    Chills  -     CBC with platelets and differential      Results for orders placed or performed in visit on 07/19/20   CBC with platelets and differential     Status: None   Result Value Ref Range    WBC 8.5 4.0 - 11.0 10e9/L    RBC Count 4.65 3.8 - 5.2 10e12/L    Hemoglobin 13.9 11.7 - 15.7 g/dL    Hematocrit 41.0 35.0 - 47.0 %    MCV 88 78 - 100 fl    MCH 29.9 26.5 - 33.0 pg    MCHC 33.9 31.5 - 36.5 g/dL    RDW 13.0 10.0 - 15.0 %    Platelet Count 339 150 - 450 10e9/L    % Neutrophils 62.2 %    % Lymphocytes 21.3 %    % Monocytes 9.8 %    % Eosinophils 5.8 %    % Basophils 0.9 %    Absolute Neutrophil 5.3 1.6 - 8.3 10e9/L    Absolute Lymphocytes 1.8 0.8 - 5.3 10e9/L    Absolute Monocytes 0.8 0.0 - 1.3 10e9/L    Absolute Eosinophils 0.5 0.0 - 0.7 10e9/L    Absolute Basophils 0.1 0.0 - 0.2 10e9/L    Diff Method Automated Method        PLAN:  1) See today's orders.  2) Follow-up with primary clinic if not improving  Discussed with pt about the lab results  Suggested if redness worsens and any increasing warmth can start the antibiotic   Advised to use topical steroid too she has at home   Follow up if  symptoms fail to improve or worsens   Pt understood and agreed with plan

## 2020-11-29 ENCOUNTER — HEALTH MAINTENANCE LETTER (OUTPATIENT)
Age: 45
End: 2020-11-29

## 2020-12-31 ENCOUNTER — TRANSFERRED RECORDS (OUTPATIENT)
Dept: HEALTH INFORMATION MANAGEMENT | Facility: CLINIC | Age: 45
End: 2020-12-31

## 2020-12-31 LAB
ALT SERPL-CCNC: 21 IU/L (ref 0–32)
AST SERPL-CCNC: 19 IU/L (ref 0–40)
CHOLEST SERPL-MCNC: 204 MG/DL (ref 100–199)
CREAT SERPL-MCNC: 0.85 MG/DL (ref 0.57–1)
GFR SERPL CREATININE-BSD FRML MDRD: 83 ML/MIN/1.73M2
GLUCOSE SERPL-MCNC: 86 MG/DL (ref 65–99)
HBA1C MFR BLD: 5.5 % (ref 4.8–5.6)
HDLC SERPL-MCNC: 50 MG/DL
LDLC SERPL CALC-MCNC: 136 MG/DL (ref 0–99)
POTASSIUM SERPL-SCNC: 4.5 MMOL/L (ref 3.5–5.2)
TRIGL SERPL-MCNC: 99 MG/DL (ref 0–149)
TSH SERPL-ACNC: 2.87 UIU/ML (ref 0.45–4.5)

## 2021-01-13 ENCOUNTER — OFFICE VISIT (OUTPATIENT)
Dept: FAMILY MEDICINE | Facility: CLINIC | Age: 46
End: 2021-01-13
Payer: COMMERCIAL

## 2021-01-13 VITALS
DIASTOLIC BLOOD PRESSURE: 80 MMHG | TEMPERATURE: 98.1 F | HEART RATE: 73 BPM | OXYGEN SATURATION: 98 % | BODY MASS INDEX: 35.97 KG/M2 | WEIGHT: 210.7 LBS | SYSTOLIC BLOOD PRESSURE: 116 MMHG | HEIGHT: 64 IN

## 2021-01-13 DIAGNOSIS — E66.01 MORBID OBESITY (H): ICD-10-CM

## 2021-01-13 DIAGNOSIS — L30.9 ECZEMA, UNSPECIFIED TYPE: ICD-10-CM

## 2021-01-13 DIAGNOSIS — S83.207A ACUTE MENISCAL TEAR OF LEFT KNEE, INITIAL ENCOUNTER: ICD-10-CM

## 2021-01-13 DIAGNOSIS — G47.33 OSA (OBSTRUCTIVE SLEEP APNEA): ICD-10-CM

## 2021-01-13 DIAGNOSIS — Z01.818 PRE-OP EXAM: Primary | ICD-10-CM

## 2021-01-13 PROBLEM — E66.811 CLASS 1 OBESITY DUE TO EXCESS CALORIES WITHOUT SERIOUS COMORBIDITY WITH BODY MASS INDEX (BMI) OF 34.0 TO 34.9 IN ADULT: Status: RESOLVED | Noted: 2018-12-10 | Resolved: 2021-01-13

## 2021-01-13 PROBLEM — E66.09 CLASS 1 OBESITY DUE TO EXCESS CALORIES WITHOUT SERIOUS COMORBIDITY WITH BODY MASS INDEX (BMI) OF 34.0 TO 34.9 IN ADULT: Status: RESOLVED | Noted: 2018-12-10 | Resolved: 2021-01-13

## 2021-01-13 PROCEDURE — 99214 OFFICE O/P EST MOD 30 MIN: CPT | Performed by: PHYSICIAN ASSISTANT

## 2021-01-13 RX ORDER — CHOLECALCIFEROL (VITAMIN D3) 10(400)/ML
DROPS ORAL
COMMUNITY
Start: 2021-01-13

## 2021-01-13 RX ORDER — MULTIVITAMIN
1 TABLET ORAL DAILY
COMMUNITY
Start: 2021-01-13

## 2021-01-13 ASSESSMENT — MIFFLIN-ST. JEOR: SCORE: 1589.7

## 2021-01-13 NOTE — PROGRESS NOTES
"[facility] PRIOR [address] [address]  Phone: [phone]  Primary Provider: Pete Culp  Pre-op Performing Provider: PETE CULP    PREOPERATIVE EVALUATION:  Today's date: 1/13/2021    Jacinta Robertson is a 45 year old female who presents for a preoperative evaluation.    Surgical Information:  Surgery/Procedure: Left Knee medial meniscus repair  Surgery Location: [facility]  Surgeon: Dr. Geronimo Pires  Surgery Date: 1/22/2021  Time of Surgery: 7:00 am  Where patient plans to recover: At home with family  Fax number for surgical facility: [phone]    Type of Anesthesia Anticipated: General    Subjective     HPI related to upcoming procedure: Cassia is a 44 yo female here today for pre-op. Had originally injured her L knee in September. Squat challenge working out, but no formal injury recalled. Had progressive catching/locking then had worsening [place] when \"snapped\" walking up the stairs with significant limitation and pain. Iced it, rested it. Evaluated by [facility] urgent care and found to have meniscal tear. Reports no arthritis and was advised to repair it now with hopes of not developing further issues.     Interval update - ongoing care with Dr. Kaur, holistic chiropractor in [place]. Had been working on BP, insulin resistance with nutritionist who recommended him to her. Seeing accupuncturist and eating better. Reports iron had been a bit lower, thyroid had been \"sluggish\" and feels this has helped her labs normalize. They increased her supplements x3 months and now back down into maintenance ranges. Happy to report that TSH and a1c have improved on recheck her labs. Brings copy for her records today - sent to scanning.    Had eczema flare-up this summer requiring steroids. After this nutritionist advised low-histamine (cuts out anything preserved; processed carbs and sugars). At most stayed on " for a few weeks to months, but then can transition back to normal diet. Feels tied to her hormones as more eczematous patches around menses. This helped her eczema as hadn't been responding to topicals.    Sees OB/GYN annually for her female care; [facility].    Preop Questions 1/10/2021   1. Have you ever had a heart attack or stroke? No   2. Have you ever had surgery on your heart or blood vessels, such as a stent placement, a coronary artery bypass, or surgery on an artery in your head, neck, heart, or legs? No   3. Do you have chest pain with activity? No   4. Do you have a history of  heart failure? No   5. Do you currently have a cold, bronchitis or symptoms of other infection? No   6. Do you have a cough, shortness of breath, or wheezing? No   7. Do you or anyone in your family have previous history of blood clots? No   8. Do you or does anyone in your family have a serious bleeding problem such as prolonged bleeding following surgeries or cuts? No   9. Have you ever had problems with anemia or been told to take iron pills? No   10. Have you had any abnormal blood loss such as black, tarry or bloody stools, or abnormal vaginal bleeding? No   11. Have you ever had a blood transfusion? No   12. Are you willing to have a blood transfusion if it is medically needed before, during, or after your surgery? Yes   13. Have you or any of your relatives ever had problems with anesthesia? No   14. Do you have sleep apnea, excessive snoring or daytime drowsiness? YES - Sleep apnea; good compliance   14a. Do you have a CPAP machine? Yes   15. Do you have any artifical heart valves or other implanted medical devices like a pacemaker, defibrillator, or continuous glucose monitor? No   16. Do you have artificial joints? No   17. Are you allergic to latex? No   18. Is there any chance that you may be pregnant? No       Health Care Directive:  Patient does not have a Health Care Directive or Living Will: Discussed advance care  planning with patient; however, patient declined at this time.    Preoperative Review of :   reviewed - no record of controlled substances prescribed.      Review of Systems  CONSTITUTIONAL: NEGATIVE for fever, chills, change in weight  INTEGUMENTARY/SKIN: NEGATIVE for worrisome rashes, moles or lesions  EYES: NEGATIVE for vision changes or irritation  ENT/MOUTH: NEGATIVE for ear, mouth and throat problems  RESP: NEGATIVE for significant cough or SOB  CV: NEGATIVE for chest pain, palpitations or peripheral edema  GI: NEGATIVE for nausea, abdominal pain, heartburn, or change in bowel habits  : NEGATIVE for frequency, dysuria, or hematuria  MUSCULOSKELETAL: + for L meniscal tear. NEGATIVE for significant arthralgias or myalgia  NEURO: NEGATIVE for weakness, dizziness or paresthesias  ENDOCRINE: NEGATIVE for temperature intolerance, skin/hair changes  HEME: NEGATIVE for bleeding problems  PSYCHIATRIC: NEGATIVE for changes in mood or affect    Patient Active Problem List    Diagnosis Date Noted     Morbid obesity (H) 2021     Priority: Medium     Migraines 2013     Priority: Medium     SHAILA (obstructive sleep apnea) 2012     Priority: Medium     Settin-15 APAP  Supplied by: Romi  PSG done: 3/16/08  AHI 9  RDI 22  Lowest O2 Sat: 87%  [place]  14 Renew       Allergic rhinitis 08/10/2011     Priority: Medium      Past Medical History:   Diagnosis Date     Class 1 obesity due to excess calories without serious comorbidity with body mass index (BMI) of 34.0 to 34.9 in adult 12/10/2018     Hx of major depression      Hypertension      Obesity (BMI 30-39.9) 3/29/2013     SHAILA (obstructive sleep apnea)     CPAP     Seasonal allergies      Past Surgical History:   Procedure Laterality Date     BUNIONECTOMY  2003    bilateral     C ORAL SURGERY PROCEDURE      wisdom teeth     ORTHOPEDIC SURGERY  2003    bilateral bunionectomy     Current Outpatient Medications   Medication Sig Dispense  "Refill     Digestive Enzyme CAPS        multivitamin (ONE-DAILY) tablet Take 1 tablet by mouth daily       Borage, Borago officinalis, (BORAGE OIL PO) Essential fatty acid       Cholecalciferol (VITAMIN D-3 PO) Take 10,000 Units by mouth       loratadine (CLARITIN) 10 MG tablet Take 10 mg by mouth       Omega-3 Fatty Acids (FISH OIL) 1200 MG capsule Take 1,200 mg by mouth daily Included in borage oil supplement, DHA + EPA       Probiotic Product (PROBIOTIC DAILY PO)        Sharps Container (SHARPS-A-GATOR LOCKING BRACKET) MISC As directed. CPAP, heated humidifier, mask, headgear, filters and tubing. For home use. Pressure: **   Length of Need: **       triamcinolone (NASACORT AQ) 55 MCG/ACT nasal inhaler Spray 2 sprays into both nostrils daily. (Patient not taking: Reported on 1/13/2021) 3 Inhaler 3     UNABLE TO FIND MEDICATION NAME: OTC allergy eye drop         No Known Allergies     Social History     Tobacco Use     Smoking status: Never Smoker     Smokeless tobacco: Never Used   Substance Use Topics     Alcohol use: Yes     Alcohol/week: 0.0 standard drinks       History   Drug Use No         Objective     /80 (BP Location: Left arm, Patient Position: Sitting, Cuff Size: Adult Large)   Pulse 73   Temp 98.1  F (36.7  C) (Tympanic)   Ht 1.632 m (5' 4.25\")   Wt 95.6 kg (210 lb 11.2 oz)   LMP 01/05/2021   SpO2 98%   BMI 35.89 kg/m      Physical Exam    GENERAL APPEARANCE: healthy, alert and no distress     EYES: EOMI, PERRL     HENT: ear canals and TM's normal and nose and mouth without ulcers or lesions     NECK: no adenopathy, no asymmetry, masses, or scars and thyroid normal to palpation     RESP: lungs clear to auscultation - no rales, rhonchi or wheezes     CV: regular rates and rhythm, normal S1 S2, no S3 or S4 and no murmur, click or rub     ABDOMEN:  soft, nontender, no HSM or masses and bowel sounds normal     MS: extremities normal- no gross deformities noted.     SKIN: no suspicious lesions " or rashes     NEURO: Normal strength and tone, sensory exam grossly normal, mentation intact and speech normal     PSYCH: mentation appears normal. and affect normal/bright     LYMPHATICS: No cervical adenopathy    Recent Labs   Lab Test 07/19/20  0915 01/28/19  1332 01/26/19  0907   HGB 13.9  --   --      --   --    NA  --   --  136   POTASSIUM  --   --  4.2   CR  --   --  0.73   A1C  --  5.5  --         Diagnostics:  Labs completed outside [place] 12/31/2020   Normal CMP, a1c and CBC  No EKG required, no history of coronary heart disease, significant arrhythmia, peripheral arterial disease or other structural heart disease.    Revised Cardiac Risk Index (RCRI):  The patient has the following serious cardiovascular risks for perioperative complications:   - No serious cardiac risks = 0 points     RCRI Interpretation: 0 points: Class I (very low risk - 0.4% complication rate)           Assessment & Plan   The proposed surgical procedure is considered LOW-INTERMEDIATE risk.    Jacinta was seen today for pre-op exam.    Diagnoses and all orders for this visit:    Pre-op exam    Acute meniscal tear of left knee, initial encounter    SHAILA (obstructive sleep apnea)    Morbid obesity (H)    Eczema, unspecified type       Risks and Recommendations:  The patient has the following additional risks and recommendations for perioperative complications:   - Morbid obesity (BMI >40)  Obstructive Sleep Apnea: encouraged good compliance with CPAP and to bring CPAP day of surgery if needed    Medication Instructions:  Patient advised to stop fish oil/supplements 1 week prior to surgery    RECOMMENDATION:  APPROVAL GIVEN to proceed with proposed procedure, without further diagnostic evaluation.    Signed Electronically by: Kristin Culp PA-C    Copy of this evaluation report is provided to requesting physician.    [employer]    [facility] Preop Guidelines    Revised Cardiac Risk Index

## 2021-03-25 ENCOUNTER — MYC MEDICAL ADVICE (OUTPATIENT)
Dept: FAMILY MEDICINE | Facility: CLINIC | Age: 46
End: 2021-03-25

## 2021-03-29 NOTE — TELEPHONE ENCOUNTER
[facility] updated  MyChart sent      Jacinta Navas RN  [facility]

## 2021-04-09 ENCOUNTER — TRANSFERRED RECORDS (OUTPATIENT)
Dept: HEALTH INFORMATION MANAGEMENT | Facility: CLINIC | Age: 46
End: 2021-04-09

## 2021-04-20 ENCOUNTER — TRANSFERRED RECORDS (OUTPATIENT)
Dept: HEALTH INFORMATION MANAGEMENT | Facility: CLINIC | Age: 46
End: 2021-04-20

## 2021-07-09 ENCOUNTER — HOSPITAL ENCOUNTER (OUTPATIENT)
Dept: MAMMOGRAPHY | Facility: CLINIC | Age: 46
Discharge: HOME | End: 2021-07-09
Attending: OBSTETRICS & GYNECOLOGY | Admitting: OBSTETRICS & GYNECOLOGY
Payer: COMMERCIAL

## 2021-07-09 DIAGNOSIS — Z12.31 OTHER SCREENING MAMMOGRAM: ICD-10-CM

## 2021-07-09 PROCEDURE — 77067 SCR MAMMO BI INCL CAD: CPT

## 2021-07-12 NOTE — RESULT ENCOUNTER NOTE
Dear Cassia,      Your recent test results are noted below:    -Mammogram was normal.  ADVISE: rechecking in 1 year.    For additional lab test information, [email or web address] is an excellent reference. Please contact the clinic at [phone] with any further questions or concerns.    Sincerely,      Kristin Culp PA-C  [facility]

## 2021-07-13 ENCOUNTER — TRANSFERRED RECORDS (OUTPATIENT)
Dept: HEALTH INFORMATION MANAGEMENT | Facility: CLINIC | Age: 46
End: 2021-07-13

## 2021-09-25 ENCOUNTER — HEALTH MAINTENANCE LETTER (OUTPATIENT)
Age: 46
End: 2021-09-25

## 2022-01-15 ENCOUNTER — HEALTH MAINTENANCE LETTER (OUTPATIENT)
Age: 47
End: 2022-01-15

## 2022-07-28 ENCOUNTER — HOSPITAL ENCOUNTER (OUTPATIENT)
Dept: MAMMOGRAPHY | Facility: CLINIC | Age: 47
Discharge: HOME | End: 2022-07-28
Attending: OBSTETRICS & GYNECOLOGY | Admitting: OBSTETRICS & GYNECOLOGY
Payer: COMMERCIAL

## 2022-07-28 DIAGNOSIS — Z12.31 BREAST CANCER SCREENING BY MAMMOGRAM: ICD-10-CM

## 2022-07-28 PROCEDURE — 77067 SCR MAMMO BI INCL CAD: CPT

## 2022-10-15 ENCOUNTER — TRANSFERRED RECORDS (OUTPATIENT)
Dept: MULTI SPECIALTY CLINIC | Facility: CLINIC | Age: 47
End: 2022-10-15

## 2022-12-26 ENCOUNTER — HEALTH MAINTENANCE LETTER (OUTPATIENT)
Age: 47
End: 2022-12-26

## 2023-04-22 ENCOUNTER — HEALTH MAINTENANCE LETTER (OUTPATIENT)
Age: 48
End: 2023-04-22

## 2023-05-23 ENCOUNTER — TRANSFERRED RECORDS (OUTPATIENT)
Dept: HEALTH INFORMATION MANAGEMENT | Facility: CLINIC | Age: 48
End: 2023-05-23
Payer: COMMERCIAL

## 2023-05-26 ENCOUNTER — VIRTUAL VISIT (OUTPATIENT)
Dept: FAMILY MEDICINE | Facility: CLINIC | Age: 48
End: 2023-05-26
Payer: COMMERCIAL

## 2023-05-26 DIAGNOSIS — I10 ESSENTIAL HYPERTENSION: Primary | ICD-10-CM

## 2023-05-26 DIAGNOSIS — E66.01 MORBID OBESITY (H): ICD-10-CM

## 2023-05-26 DIAGNOSIS — R73.03 PREDIABETES: ICD-10-CM

## 2023-05-26 PROCEDURE — 99214 OFFICE O/P EST MOD 30 MIN: CPT | Mod: VID | Performed by: PHYSICIAN ASSISTANT

## 2023-05-26 RX ORDER — LISINOPRIL 10 MG/1
TABLET ORAL
Qty: 30 TABLET | Refills: 1 | Status: SHIPPED | OUTPATIENT
Start: 2023-05-26 | End: 2024-01-16 | Stop reason: DRUGHIGH

## 2023-05-26 RX ORDER — PROGESTERONE 100 MG/1
300 CAPSULE ORAL DAILY
COMMUNITY
Start: 2022-05-01

## 2023-05-26 NOTE — PROGRESS NOTES
Cassia is a 48 year old who is being evaluated via a billable video visit.      How would you like to obtain your AVS? [email or web address]  If the video visit is dropped, the invitation should be resent by: Text to cell phone: [phone]  Will anyone else be joining your video visit? No        Assessment & Plan     Jacinta was seen today for results.    Diagnoses and all orders for this visit:    Essential hypertension  -     REVIEW OF HEALTH MAINTENANCE PROTOCOL ORDERS  -     lisinopril (ZESTRIL) 10 MG tablet; Take 10mg daily for 2-4 weeks then increase to 20mg daily if BP >140/90.  -     Comprehensive metabolic panel (BMP + Alb, Alk Phos, ALT, AST, Total. Bili, TP); Future    Prediabetes    Morbid obesity (H)    Ongoing care with OB/GYN for female health and found to have elevated BP which was persistently elevated on home-tracking. Planning to see nutritionist, loose weight and encouraged efforts. Will start medication though to ensure improvement in the meantime.10mg lisinopril for 2-4 weeks then increase to 20mg daily there after if >140/90 for another 2-4 weeks then recheck. Return for lab prior to starting and repeat BMP after final titration. Consider diuretic at follow-up if needed. Risks/benefits of med reviewed. Consider losartan if negative SE like cough. Will send her labs from OB in [place] so we can send to abstraction. Patient in agreement with plan.     34 minutes spent by me on the date of the encounter doing chart review, history and exam, documentation and further activities per the note           Kristin Culp PA-C  [facility] PRIOR [facility]   Cassia is a 48 year old, presenting for the following health issues:  Results         View : No data to display.              HPI   Has blood work done with her OB/GYN at [facility] - pt requested records for us.  Biggest concern - Tracking BP at home and last month has been elevated; typically 145-165/high90s-low 100's. Feels her avg may be  closer to the 130-140 systolic. More stress over the past few weeks. No hx of CVD or CVA.  At appt was 180/116 - highest that she's had.  Was advised to see PCP for management.  Feels optimizing non-medication ways to improve excluding still overweight - currently 226.4 lbs, BMI 38.2  Made appt with nutritionist again - used to see one, but hasn't in a few yrs.   Continues to be in pre-diabetes; but high end of normal.  Using CGM - usually higher in AM; was 116 then down to 95. Usually in the 90's-rarely low 100's.  Fasting blood work from 5/23/2023 was glucose: 83, a1c 5.4, insulin 10.1  Last year 2022 - glucose 93 a1c was 5.6, insulin 12.1  Did not do renal panel  Did colonoscopy at [facility] 2021      PMHx SHAILA using CPAP consistently x15 yrs. Sees sleep med every few yrs. Believes through [facility] now.    Lab results - high blood pressure- discuss medication- discuss blood sugar- discuss medication    Current Outpatient Medications   Medication     Cholecalciferol (VITAMIN D-3 PO)     Digestive Enzyme CAPS     loratadine (CLARITIN) 10 MG tablet     multivitamin (ONE-DAILY) tablet     Omega-3 Fatty Acids (FISH OIL) 1200 MG capsule     Probiotic Product (PROBIOTIC DAILY PO)     UNABLE TO FIND     Borage, Borago officinalis, (BORAGE OIL PO)     Sharps Container (SHARPS-A-GATOR LOCKING BRACKET) MISC     triamcinolone (NASACORT AQ) 55 MCG/ACT nasal inhaler     No current facility-administered medications for this visit.      No Known Allergies      Review of Systems   Constitutional, HEENT, cardiovascular, pulmonary, gi and gu, endocrine systems are negative, except as otherwise noted.      Objective           Vitals:  No vitals were obtained today due to virtual visit.    Physical Exam   GENERAL: Healthy, alert and no distress  EYES: Eyes grossly normal to inspection.  No discharge or erythema, or obvious scleral/conjunctival abnormalities.  RESP: No audible wheeze, cough, or visible cyanosis.  No visible retractions  or increased work of breathing.    SKIN: Visible skin clear. No significant rash, abnormal pigmentation or lesions.  NEURO: Cranial nerves grossly intact.  Mentation and speech appropriate for age.  PSYCH: Mentation appears normal, affect normal/bright, judgement and insight intact, normal speech and appearance well-groomed.    Reviewed reported labs per pt per HPI        Video-Visit Details    Type of service:  Video Visit     Originating Location (pt. Location): Home  Distant Location (provider location):  On-site  Platform used for Video Visit: [employer]

## 2023-05-30 ENCOUNTER — LAB (OUTPATIENT)
Dept: LAB | Facility: CLINIC | Age: 48
End: 2023-05-30
Payer: COMMERCIAL

## 2023-05-30 DIAGNOSIS — I10 ESSENTIAL HYPERTENSION: ICD-10-CM

## 2023-05-30 PROCEDURE — 80053 COMPREHEN METABOLIC PANEL: CPT

## 2023-05-30 PROCEDURE — 36415 COLL VENOUS BLD VENIPUNCTURE: CPT

## 2023-05-31 LAB
ALBUMIN SERPL BCG-MCNC: 4.3 G/DL (ref 3.5–5.2)
ALP SERPL-CCNC: 58 U/L (ref 35–104)
ALT SERPL W P-5'-P-CCNC: 27 U/L (ref 10–35)
ANION GAP SERPL CALCULATED.3IONS-SCNC: 13 MMOL/L (ref 7–15)
AST SERPL W P-5'-P-CCNC: 28 U/L (ref 10–35)
BILIRUB SERPL-MCNC: <0.2 MG/DL
BUN SERPL-MCNC: 12.9 MG/DL (ref 6–20)
CALCIUM SERPL-MCNC: 9.9 MG/DL (ref 8.6–10)
CHLORIDE SERPL-SCNC: 100 MMOL/L (ref 98–107)
CREAT SERPL-MCNC: 0.76 MG/DL (ref 0.51–0.95)
DEPRECATED HCO3 PLAS-SCNC: 23 MMOL/L (ref 22–29)
GFR SERPL CREATININE-BSD FRML MDRD: >90 ML/MIN/1.73M2
GLUCOSE SERPL-MCNC: 75 MG/DL (ref 70–99)
POTASSIUM SERPL-SCNC: 4.5 MMOL/L (ref 3.4–5.3)
PROT SERPL-MCNC: 7.3 G/DL (ref 6.4–8.3)
SODIUM SERPL-SCNC: 136 MMOL/L (ref 136–145)

## 2023-06-06 NOTE — RESULT ENCOUNTER NOTE
Dear Cassia,      Your recent test results are noted below:    -Liver and gallbladder tests are normal (ALT,AST, Alk phos, bilirubin), kidney function is normal (Cr, GFR), sodium is normal, potassium is normal, calcium is normal, glucose is normal.  Proceed with lisinopril as planned per clinic discussion.    For additional lab test information, [email or web address] is an excellent reference. Please contact the clinic at [phone] with any further questions or concerns.    Sincerely,      Kristin Culp PA-C  [facility]

## 2023-07-13 ENCOUNTER — ANCILLARY ORDERS (OUTPATIENT)
Dept: SURGERY | Facility: OTHER | Age: 48
End: 2023-07-13

## 2023-07-13 DIAGNOSIS — Z12.31 VISIT FOR SCREENING MAMMOGRAM: Primary | ICD-10-CM

## 2023-07-24 ENCOUNTER — VIRTUAL VISIT (OUTPATIENT)
Dept: FAMILY MEDICINE | Facility: CLINIC | Age: 48
End: 2023-07-24
Payer: COMMERCIAL

## 2023-07-24 DIAGNOSIS — J30.9 ALLERGIC RHINITIS, UNSPECIFIED SEASONALITY, UNSPECIFIED TRIGGER: ICD-10-CM

## 2023-07-24 DIAGNOSIS — I10 ESSENTIAL HYPERTENSION: Primary | ICD-10-CM

## 2023-07-24 DIAGNOSIS — L30.9 ECZEMA, UNSPECIFIED TYPE: ICD-10-CM

## 2023-07-24 PROCEDURE — 99215 OFFICE O/P EST HI 40 MIN: CPT | Mod: VID | Performed by: PHYSICIAN ASSISTANT

## 2023-07-24 NOTE — PROGRESS NOTES
Cassia is a 48 year old who is being evaluated via a billable video visit.      How would you like to obtain your AVS? MyChart  If the video visit is dropped, the invitation should be resent by: Text to cell phone: [phone]  Will anyone else be joining your video visit? No          Assessment & Plan     Jacinta was seen today for recheck medication.    Diagnoses and all orders for this visit:    Essential hypertension  -     Improved and now at goal on lisinopril 20mg daily. Continue without changes. Repea BMP for stability. Recheck in 1 yr sooner with worsening, hypotension if has more weight loss and consider dose reduction or anytime with concerns.  -  Basic metabolic panel  (Ca, Cl, CO2, Creat, Gluc, K, Na, BUN); Future    Eczema, unspecified type  Allergic rhinitis, unspecified seasonality, unspecified trigger  -     Worsening eczema despite using claritin BID per derm and allergies more problematic this past spring. Seem to be trigger with worsening allergies and contributed to by leidy-menopause. Hasn't seen allergist since her 20's and recommended consult. Patient in agreement with plan.   - Adult Allergy/Asthma Referral; Future    42 minutes spent by me on the date of the encounter doing chart review, history and exam, documentation and further activities per the note      Kristin Culp PA-C  [facility]   Cassia is a 48 year old, presenting for the following health issues:  Recheck Medication      7/24/2023    11:09 AM   Additional Questions   Roomed by adilene shepard      History of Present Illness       Hypertension: recheck since starting lisinopril 20mg.  Tried just 10mg and was still 90's diastolic so increased to 20mg after couple weeks.  No negative SE at higher dose and no issues that she can tell.   Never above 140/90 anymore.   Highest was 135/84  Lowest 98/68 after resting and occasionally will be high 90's systolic, but usually only in the morning and before  "she's eaten anything.   Majority in the 1-teens/lower 80's  Today checked without resting and was 123/80  7/22 - 117/82  7/17 - 115/75  7/15 - 97/70, 124/82  Overall improved.    Started seeing nutritionist - sugar and dairy free.  Has noticed that eczema is coming out more - gets dishidrotic eczema of hands - has been going on since later phases of leidy-menopause for her. Also has spot on anterior neck and dorsal L wrist.   Wonders about hormone or histamine testing - \"S[place] test\". Seeing OB/GYN and reviewed hormone testing with them - taking progesterone which helped with her sleep.     Saw dermatology through [facility] when started to have issues with her hands - tried topical steroids and nothing worked. Decreased to part-time as sweat from gloves made it worse. Worked on low-histamine diet, working with functional med on lower histamine diet.  Increased stress 2020/pandemic - eczema came out again on her face/significant flare-up required prednisone.  Went back on low-histamine diet for 1 month - \"really restrictive\" as sometimes has to avoid healthy foods too.  Spring allergies worse - chronically taking claritin, but derm had her increased to BID for past couple of days. Went off for period of time and nasal drainage, eye watering, coughing and eczema recurs. Had allergist back in her 20's, but not since. Main concern is wants to avoid severity of flare she has back in 2020.   Feels all related to weight, hormones as well. Only eating meat and veggies for past 1-2 month - has only lost about 4 lbs.   Still gets periods - shorter and closer together, last about 6 days. Associated with mild migraine type HA most cycles. If has eczema will flare day of her period.      She presents for follow up of hypertension.  She does check blood pressure  regularly outside of the clinic. Outpatient blood pressures have not been over 140/90. She does not follow a low salt diet.     She eats 4 or more servings of fruits and " vegetables daily.She consumes 0 sweetened beverage(s) daily.She exercises with enough effort to increase her heart rate 20 to 29 minutes per day.  She exercises with enough effort to increase her heart rate 5 days per week.   She is taking medications regularly.        Review of Systems   Constitutional, HEENT, cardiovascular, pulmonary, gi and gu systems are negative, except as otherwise noted.      Objective           Vitals:  No vitals were obtained today due to virtual visit.    Physical Exam   GENERAL: Healthy, alert and no distress  EYES: Eyes grossly normal to inspection.  No discharge or erythema, or obvious scleral/conjunctival abnormalities.  RESP: No audible wheeze, cough, or visible cyanosis.  No visible retractions or increased work of breathing.    SKIN: shows me slightly erythematous thickened patches over anterior neck and L dorsal wrist  NEURO: Cranial nerves grossly intact.  Mentation and speech appropriate for age.  PSYCH: Mentation appears normal, affect normal/bright, judgement and insight intact, normal speech and appearance well-groomed.              Video-Visit Details    Type of service:  Video Visit     Originating Location (pt. Location): Home    Distant Location (provider location):  On-site  Platform used for Video Visit: Yessenia

## 2023-07-31 ENCOUNTER — ANCILLARY ORDERS (OUTPATIENT)
Dept: FAMILY MEDICINE | Facility: CLINIC | Age: 48
End: 2023-07-31

## 2023-07-31 ENCOUNTER — HOSPITAL ENCOUNTER (OUTPATIENT)
Dept: MAMMOGRAPHY | Facility: CLINIC | Age: 48
Discharge: HOME | End: 2023-07-31
Attending: OBSTETRICS & GYNECOLOGY | Admitting: OBSTETRICS & GYNECOLOGY
Payer: COMMERCIAL

## 2023-07-31 DIAGNOSIS — Z12.31 VISIT FOR SCREENING MAMMOGRAM: Primary | ICD-10-CM

## 2023-07-31 DIAGNOSIS — Z12.31 VISIT FOR SCREENING MAMMOGRAM: ICD-10-CM

## 2023-07-31 PROCEDURE — 77067 SCR MAMMO BI INCL CAD: CPT

## 2023-08-22 ENCOUNTER — LAB (OUTPATIENT)
Dept: LAB | Facility: CLINIC | Age: 48
End: 2023-08-22
Payer: COMMERCIAL

## 2023-08-22 DIAGNOSIS — I10 ESSENTIAL HYPERTENSION: ICD-10-CM

## 2023-08-22 LAB
ANION GAP SERPL CALCULATED.3IONS-SCNC: 12 MMOL/L (ref 7–15)
BUN SERPL-MCNC: 20.8 MG/DL (ref 6–20)
CALCIUM SERPL-MCNC: 9.1 MG/DL (ref 8.6–10)
CHLORIDE SERPL-SCNC: 102 MMOL/L (ref 98–107)
CREAT SERPL-MCNC: 0.85 MG/DL (ref 0.51–0.95)
DEPRECATED HCO3 PLAS-SCNC: 22 MMOL/L (ref 22–29)
GFR SERPL CREATININE-BSD FRML MDRD: 84 ML/MIN/1.73M2
GLUCOSE SERPL-MCNC: 100 MG/DL (ref 70–99)
POTASSIUM SERPL-SCNC: 4.4 MMOL/L (ref 3.4–5.3)
SODIUM SERPL-SCNC: 136 MMOL/L (ref 136–145)

## 2023-08-22 PROCEDURE — 36415 COLL VENOUS BLD VENIPUNCTURE: CPT

## 2023-08-22 PROCEDURE — 80048 BASIC METABOLIC PNL TOTAL CA: CPT

## 2023-08-28 NOTE — RESULT ENCOUNTER NOTE
Dear Cassia,      Your recent test results are noted below:    Electrolytes and renal function stable. Ok to continue lisinopril. BS mildly elevated consistent with prediabetes if you were fasting. Continue positive lifestyle changes as previously discussed.    For additional lab test information, [email or web address] is an excellent reference. Please contact the clinic at [phone] with any further questions or concerns.    Sincerely,      Kristin Culp PA-C  [facility]

## 2023-10-17 ENCOUNTER — TRANSFERRED RECORDS (OUTPATIENT)
Dept: HEALTH INFORMATION MANAGEMENT | Facility: CLINIC | Age: 48
End: 2023-10-17

## 2023-10-17 LAB
ALT SERPL-CCNC: 27 U/L (ref 6–29)
AST SERPL-CCNC: 18 U/L (ref 10–35)
CHOLESTEROL (EXTERNAL): 241 MG/DL
CREATININE (EXTERNAL): 0.8 MG/DL (ref 0.5–1.99)
GFR ESTIMATED (EXTERNAL): 91 ML/MIN/1.73M2
GLUCOSE (EXTERNAL): 92 MG/DL (ref 65–99)
HBA1C MFR BLD: 5.3 %
HDLC SERPL-MCNC: 49 MG/DL
LDL CHOLESTEROL CALCULATED (EXTERNAL): 168 MG/DL
NON HDL CHOLESTEROL (EXTERNAL): 192 MG/DL
POTASSIUM (EXTERNAL): 4.8 MMOL/L (ref 3.5–5.3)
TRIGLYCERIDES (EXTERNAL): 115 MG/DL
TSH SERPL-ACNC: 2.1 MIU/L (ref 0.4–4.5)

## 2023-11-20 ENCOUNTER — OFFICE VISIT (OUTPATIENT)
Dept: ALLERGY | Facility: CLINIC | Age: 48
End: 2023-11-20
Attending: PHYSICIAN ASSISTANT
Payer: COMMERCIAL

## 2023-11-20 VITALS
HEART RATE: 68 BPM | OXYGEN SATURATION: 99 % | WEIGHT: 219.6 LBS | SYSTOLIC BLOOD PRESSURE: 123 MMHG | DIASTOLIC BLOOD PRESSURE: 83 MMHG | BODY MASS INDEX: 37.4 KG/M2

## 2023-11-20 DIAGNOSIS — R05.3 CHRONIC COUGH: ICD-10-CM

## 2023-11-20 DIAGNOSIS — J30.1 SEASONAL ALLERGIC RHINITIS DUE TO POLLEN: Primary | ICD-10-CM

## 2023-11-20 DIAGNOSIS — R09.81 NASAL CONGESTION: ICD-10-CM

## 2023-11-20 DIAGNOSIS — L30.9 ECZEMA, UNSPECIFIED TYPE: ICD-10-CM

## 2023-11-20 PROCEDURE — 95004 PERQ TESTS W/ALRGNC XTRCS: CPT | Performed by: INTERNAL MEDICINE

## 2023-11-20 PROCEDURE — 99204 OFFICE O/P NEW MOD 45 MIN: CPT | Mod: 25 | Performed by: INTERNAL MEDICINE

## 2023-11-20 ASSESSMENT — ENCOUNTER SYMPTOMS
SINUS PRESSURE: 1
PSYCHIATRIC NEGATIVE: 1
GASTROINTESTINAL NEGATIVE: 1
ALLERGIC/IMMUNOLOGIC NEGATIVE: 1
HEMATOLOGIC/LYMPHATIC NEGATIVE: 1
ENDOCRINE NEGATIVE: 1
COUGH: 1
NEUROLOGICAL NEGATIVE: 1
CARDIOVASCULAR NEGATIVE: 1
EYES NEGATIVE: 1
CONSTITUTIONAL NEGATIVE: 1
MUSCULOSKELETAL NEGATIVE: 1
SORE THROAT: 1

## 2023-11-20 NOTE — LETTER
11/20/2023         RE: Jacinta Robertson  [address] [facility] [id number]        Dear Colleague,    Thank you for referring your patient, Jacinta Robertson, to the [facility] [place]. Please see a copy of my visit note below.    Jacinta Robertson was seen in the Allergy Clinic at [facility].    Jacinta Robertson is a 48 year old female being seen today at the request of Kristin Culp PA-C in consultation for Eczema and Allergic Rhinitis.    She is interested in allergy shots.  She has a history of eczema and sees dermatology.  It has been years since she saw dermatology.  She also sees a naturopath.     This year she has had increased allergy symptoms compared to previous years.  In the spring and summer she had postnasal drainage, cough, sneezing and nasal congestion.  She also has intermittent episodes of an area on the left tonsil that feels irritated followed by eye watering and nasal drainage which last 2 to 3 minutes and then resolves.  This happens 2-3 times a day.  It has increased in severity since August.    In 2020 she went to urgent care due to significant eczematous changes and was treated with Keflex and Medrol.    Triggers for her eczema include stress.    When she goes outside in the fall she will wear a mask since her ragweed allergy is quite severe.    She does use Claritin once to twice daily as well as Nasacort and Alaway eyedrops spring through fall.  Usually the month of July she does not have any allergy symptoms, but this year she did have allergy symptoms.      Past Medical History:   Diagnosis Date     Class 1 obesity due to excess calories without serious comorbidity with body mass index (BMI) of 34.0 to 34.9 in adult 12/10/2018     Hx of major depression      Hypertension      Obesity (BMI 30-39.9) 3/29/2013     SHAILA (obstructive sleep apnea)     CPAP     Seasonal allergies      Family History   Problem Relation Age  of Onset     Hypertension Mother          age 71     C.A.D. Mother      Hypertension Father      Lipids Father      Hyperlipidemia Father      Breast Cancer Paternal Aunt         Cousin as well     Hypertension Maternal Half-Sister      Breast Cancer Other         pat cousins     Past Surgical History:   Procedure Laterality Date     BUNIONECTOMY  2003    bilateral     ORTHOPEDIC SURGERY  2003    bilateral bunionectomy     ZZC ORAL SURGERY PROCEDURE      wisdom teeth       ENVIRONMENTAL HISTORY:   Pets inside the house include 1 dog(s).  Do you smoke cigarettes or other recreational drugs? No There is/are 0 smokers living in the house. The house does not have a damp basement.     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      Review of Systems   Constitutional: Negative.    HENT:  Positive for congestion, postnasal drip, sinus pressure and sore throat.    Eyes: Negative.    Respiratory:  Positive for cough.    Cardiovascular: Negative.    Gastrointestinal: Negative.    Endocrine: Negative.    Genitourinary: Negative.    Musculoskeletal: Negative.    Skin: Negative.    Allergic/Immunologic: Negative.    Neurological: Negative.    Hematological: Negative.    Psychiatric/Behavioral: Negative.           Current Outpatient Medications:      Cholecalciferol (VITAMIN D-3 PO), Take 10,000 Units by mouth, Disp: , Rfl:      Digestive Enzyme CAPS, , Disp:  , Rfl:      lisinopril (ZESTRIL) 20 MG tablet, Take 1 tablet (20 mg) by mouth daily, Disp: 90 tablet, Rfl: 3     loratadine (CLARITIN) 10 MG tablet, Take 10 mg by mouth, Disp: , Rfl:      MAGNESIUM GLYCINATE PO, Take 100 mg by mouth daily, Disp: , Rfl:      multivitamin (ONE-DAILY) tablet, Take 1 tablet by mouth daily, Disp:  , Rfl:      Omega-3 Fatty Acids (FISH OIL) 1200 MG capsule, Take 1,200 mg by mouth daily DHA + EPA, Disp: , Rfl:      Probiotic Product (PROBIOTIC DAILY PO), , Disp: , Rfl:      progesterone  (PROMETRIUM) 100 MG capsule, Take 300 mg by mouth daily, Disp: , Rfl:      Sharps Container (SHARPS-A-GATOR LOCKING BRACKET) MISC, As directed. CPAP, heated humidifier, mask, headgear, filters and tubing. For home use. Pressure: **   Length of Need: **, Disp: , Rfl:      UNABLE TO FIND, MEDICATION NAME: OTC allergy eye drop, Disp: , Rfl:      lisinopril (ZESTRIL) 10 MG tablet, Take 10mg daily for 2-4 weeks then increase to 20mg daily if BP >140/90. (Patient not taking: Reported on 11/20/2023), Disp: 30 tablet, Rfl: 1     triamcinolone (NASACORT AQ) 55 MCG/ACT nasal inhaler, Spray 2 sprays into both nostrils daily. (Patient not taking: Reported on 11/20/2023), Disp: 3 Inhaler, Rfl: 3  No Known Allergies      EXAM:   /83   Pulse 68   Wt 99.6 kg (219 lb 9.6 oz)   SpO2 99%   BMI 37.40 kg/m      Physical Exam    Constitutional:       General: She is not in acute distress.     Appearance: Normal appearance. She is not ill-appearing.   HENT:      Head: Normocephalic and atraumatic.      Nose: Nose normal. No congestion or rhinorrhea.      Mouth/Throat:      Mouth: Mucous membranes are moist.      Pharynx: Oropharynx is clear. No posterior oropharyngeal erythema.   Eyes:      General:         Right eye: No discharge.         Left eye: No discharge.   Cardiovascular:      Rate and Rhythm: Normal rate and regular rhythm.      Heart sounds: Normal heart sounds.   Pulmonary:      Effort: Pulmonary effort is normal.      Breath sounds: Normal breath sounds. No wheezing or rhonchi.   Skin:     General: Skin is warm.      Findings: No erythema or rash.   Neurological:      General: No focal deficit present.      Mental Status: She is alert. Mental status is at baseline.   Psychiatric:         Mood and Affect: Mood normal.         Behavior: Behavior normal.      WORKUP: Skin testing was positive to trees and weeds and 1 mold.    ENVIRONMENTAL PERCUTANEOUS SKIN TESTING: ADULT      11/20/2023     9:00 AM   [facility]  Environmental   Consent Y   Ordering Physician Dr. Jacobson   Interpreting Physician Dr. Jacobson   Testing Technician Mary LAL RN   Location Back   Time start: 09:31   Time End: 09:46   Positive Control: Histatrol*ALK 1 mg/ml 4/20   Negative Control: 50% Glycerin 0   Cat Hair*ALK (10,000 BAU/ml) 0   AP Dog Hair/Dander (1:100 w/v) 0   Dust Mite p. 30,000 AU/ml 0   Dust Mite f. (30,000 AU/ml) 0   Jason (W/F in millimeters) 0   Bruce Grass (100,000 BAU/mL) 0   Red Cedar (W/F in millimeters) 0   Maple/[place]ala (W/F in millimeters) 5/10   Hackberry (W/F in millimeters) 3/5   [place] (W/F in millimeters) 0   Grundy *ALK (W/F in millimeters) 0   American Elm (W/F in millimeters) 0   [place] (W/F in millimeters) 3/5   [place] (W/F in millimeters) 0   Birch Mix (W/F in millimeters) 5/15   Furlong (W/F in millimeters) 0   Oak (W/F in millimeters) 3/8   Cocklebur (W/F in millimeters) 0   Union (W/F in millimeters) 0   White Mulugeta (W/F in millimeters) 0   Careless (W/F in millimeters) 0   Nettle (W/F in millimeters) 0   English Plantain (W/F in millimeters) 0   Kochia (W/F in millimeters) 0   Lamb's Quarter (W/F in millimeters) 0   Marshelder (W/F in millimeters) 5/15   Ragweed Mix* ALK (W/F in millimeters) 10/40   Russian Thistle (W/F in millimeters) 0   Sagebrush/Mugwort (W/F in millimeters) 0   Sheep Sorrel (W/F in millimeters) 3/8   Feather Mix* ALK (W/F in millimeters) 0   Penicillium Mix (1:10 w/v) 0   Curvularia spicifera (1:10 w/v) 0   Epicoccum (1:10 w/v) 0   Aspergillus fumigatus (1:10 w/v): 2/5   Alternaria tenius (1:10 w/v) 0   H. Cladosporium (1:10 w/v) 0   Phoma herbarum (1:10 w/v) 0         ASSESSMENT/PLAN:  Jacinta Robertson is a 48 year old female seen today for environmental allergies with eczema.  She had previous testing in [place].  She has not received allergy shots.  She is interested in allergy shots.  Risks and benefits were discussed.    She also has a acute episodes that last 2 to 3  minutes tonsil irritation followed by eye watering and rhinorrhea.  This happens 2-3 times a day.    The patient was counseled regarding the time commitment, billing responsibility depending on the insurance coverage (also the insurance will be billed once allergen immunotherapy serums are compounded), auto-injectable epinephrine device policy, risks (I stated risks include hives, swelling, shortness of breath. I did state that one in 2.5 million shot administrations can result in death), and benefits of allergen immunotherapy and she wishes to proceed.  We went over the informed consent that needed to be signed, agreeing to remain in the clinic for 30 minutes after the injection.  Epipen will be prescribed if starting shots  Continue with Claritin 10 mg once to twice daily with Nasacort as needed.  Also use Alaway eyedrops as needed.  Will discuss with ENT providers if they have any thoughts on her 2 to 3-minute episodes of eye watering and nasal running.    Follow-up in 4 months      Thank you for allowing me to participate in the care of Jacinta Robertson.      I spent 40 minutes on the date of the encounter doing chart review, history and exam, documentation and further coordination as noted above exclusive of separately reported interpretations    Carlyle Jacobson MD  Allergy/Immunology  [facility]       Per provider verbal order, placed Adult Environmental Panel scratch test.  Verbal consent was obtained by MD prior to procedure.  Once panels were placed, patient was monitored for 15 minutes in clinic.  Provider read test after 15 minutes.  Pt tolerated procedure well.  All questions and concerns were addressed at office visit.     CARLOS Masters, RN       RN reviewed Anaphylaxis Action Plan with patient. Educated on the symptoms and treatment of anaphylaxis. Went through the different ways that a reaction can present, and the body systems that it can affect. Patient verbalized  understanding.    Writer demonstrated how to use an EpiPen auto-injector.  Patient instructed to form a fist around the auto-injector, remove blue safety release by pulling straight up, then firmly push orange tip against outer thigh so it clicks, holding for 3 seconds.  Patient advised that once used, needle will not be exposed, as orange tip extends.  Patient advised to call 911 or go to emergency department after epi-pen use for further monitoring.       CARLOS Saxena, RN  11/20/2023 10:32 AM       Again, thank you for allowing me to participate in the care of your patient.        Sincerely,        Carlyle Jacobson MD

## 2023-11-20 NOTE — PROGRESS NOTES
RN reviewed Anaphylaxis Action Plan with patient. Educated on the symptoms and treatment of anaphylaxis. Went through the different ways that a reaction can present, and the body systems that it can affect. Patient verbalized understanding.    Writer demonstrated how to use an EpiPen auto-injector.  Patient instructed to form a fist around the auto-injector, remove blue safety release by pulling straight up, then firmly push orange tip against outer thigh so it clicks, holding for 3 seconds.  Patient advised that once used, needle will not be exposed, as orange tip extends.  Patient advised to call 911 or go to emergency department after epi-pen use for further monitoring.       CARLOS Saxena, RN  11/20/2023 10:32 AM

## 2023-11-20 NOTE — PROGRESS NOTES
Jacinta Robertson was seen in the Allergy Clinic at [facility].    Jacinta Robertson is a 48 year old female being seen today at the request of Kristin Culp PA-C in consultation for Eczema and Allergic Rhinitis.    She is interested in allergy shots.  She has a history of eczema and sees dermatology.  It has been years since she saw dermatology.  She also sees a naturopath.     This year she has had increased allergy symptoms compared to previous years.  In the spring and summer she had postnasal drainage, cough, sneezing and nasal congestion.  She also has intermittent episodes of an area on the left tonsil that feels irritated followed by eye watering and nasal drainage which last 2 to 3 minutes and then resolves.  This happens 2-3 times a day.  It has increased in severity since August.    In  she went to urgent care due to significant eczematous changes and was treated with Keflex and Medrol.    Triggers for her eczema include stress.    When she goes outside in the fall she will wear a mask since her ragweed allergy is quite severe.    She does use Claritin once to twice daily as well as Nasacort and Alaway eyedrops spring through .  Usually the month of July she does not have any allergy symptoms, but this year she did have allergy symptoms.      Past Medical History:   Diagnosis Date    Class 1 obesity due to excess calories without serious comorbidity with body mass index (BMI) of 34.0 to 34.9 in adult 12/10/2018    Hx of major depression     Hypertension     Obesity (BMI 30-39.9) 3/29/2013    SHAILA (obstructive sleep apnea)     CPAP    Seasonal allergies      Family History   Problem Relation Age of Onset    Hypertension Mother          age 71    C.A.D. Mother     Hypertension Father     Lipids Father     Hyperlipidemia Father     Breast Cancer Paternal Aunt         Cousin as well    Hypertension Maternal Half-Sister     Breast Cancer Other         pat  cousins     Past Surgical History:   Procedure Laterality Date    BUNIONECTOMY  2003    bilateral    ORTHOPEDIC SURGERY  2003    bilateral bunionectomy    [facility] ORAL SURGERY PROCEDURE      wisdom teeth       ENVIRONMENTAL HISTORY:   Pets inside the house include 1 dog(s).  Do you smoke cigarettes or other recreational drugs? No There is/are 0 smokers living in the house. The house does not have a damp basement.     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      Review of Systems   Constitutional: Negative.    HENT:  Positive for congestion, postnasal drip, sinus pressure and sore throat.    Eyes: Negative.    Respiratory:  Positive for cough.    Cardiovascular: Negative.    Gastrointestinal: Negative.    Endocrine: Negative.    Genitourinary: Negative.    Musculoskeletal: Negative.    Skin: Negative.    Allergic/Immunologic: Negative.    Neurological: Negative.    Hematological: Negative.    Psychiatric/Behavioral: Negative.           Current Outpatient Medications:     Cholecalciferol (VITAMIN D-3 PO), Take 10,000 Units by mouth, Disp: , Rfl:     Digestive Enzyme CAPS, , Disp:  , Rfl:     lisinopril (ZESTRIL) 20 MG tablet, Take 1 tablet (20 mg) by mouth daily, Disp: 90 tablet, Rfl: 3    loratadine (CLARITIN) 10 MG tablet, Take 10 mg by mouth, Disp: , Rfl:     MAGNESIUM GLYCINATE PO, Take 100 mg by mouth daily, Disp: , Rfl:     multivitamin (ONE-DAILY) tablet, Take 1 tablet by mouth daily, Disp:  , Rfl:     Omega-3 Fatty Acids (FISH OIL) 1200 MG capsule, Take 1,200 mg by mouth daily DHA + EPA, Disp: , Rfl:     Probiotic Product (PROBIOTIC DAILY PO), , Disp: , Rfl:     progesterone (PROMETRIUM) 100 MG capsule, Take 300 mg by mouth daily, Disp: , Rfl:     Sharps Container (SHARPS-A-GATOR LOCKING BRACKET) MISC, As directed. CPAP, heated humidifier, mask, headgear, filters and tubing. For home use. Pressure: **   Length of Need: **, Disp: , Rfl:     UNABLE TO FIND, MEDICATION  NAME: OTC allergy eye drop, Disp: , Rfl:     lisinopril (ZESTRIL) 10 MG tablet, Take 10mg daily for 2-4 weeks then increase to 20mg daily if BP >140/90. (Patient not taking: Reported on 11/20/2023), Disp: 30 tablet, Rfl: 1    triamcinolone (NASACORT AQ) 55 MCG/ACT nasal inhaler, Spray 2 sprays into both nostrils daily. (Patient not taking: Reported on 11/20/2023), Disp: 3 Inhaler, Rfl: 3  No Known Allergies      EXAM:   /83   Pulse 68   Wt 99.6 kg (219 lb 9.6 oz)   SpO2 99%   BMI 37.40 kg/m      Physical Exam    Constitutional:       General: She is not in acute distress.     Appearance: Normal appearance. She is not ill-appearing.   HENT:      Head: Normocephalic and atraumatic.      Nose: Nose normal. No congestion or rhinorrhea.      Mouth/Throat:      Mouth: Mucous membranes are moist.      Pharynx: Oropharynx is clear. No posterior oropharyngeal erythema.   Eyes:      General:         Right eye: No discharge.         Left eye: No discharge.   Cardiovascular:      Rate and Rhythm: Normal rate and regular rhythm.      Heart sounds: Normal heart sounds.   Pulmonary:      Effort: Pulmonary effort is normal.      Breath sounds: Normal breath sounds. No wheezing or rhonchi.   Skin:     General: Skin is warm.      Findings: No erythema or rash.   Neurological:      General: No focal deficit present.      Mental Status: She is alert. Mental status is at baseline.   Psychiatric:         Mood and Affect: Mood normal.         Behavior: Behavior normal.      WORKUP: Skin testing was positive to trees and weeds and 1 mold.    ENVIRONMENTAL PERCUTANEOUS SKIN TESTING: ADULT      11/20/2023     9:00 AM   [facility]   Consent Y   Ordering Physician Dr. Jacobson   Interpreting Physician Dr. Jacobson   Testing Technician Mary LAL RN   Location Back   Time start: 09:31   Time End: 09:46   Positive Control: Histatrol*ALK 1 mg/ml 4/20   Negative Control: 50% Glycerin 0   Cat Hair*ALK (10,000 BAU/ml) 0   AP Dog  Hair/Dander (1:100 w/v) 0   Dust Mite p. 30,000 AU/ml 0   Dust Mite f. (30,000 AU/ml) 0   Jason (W/F in millimeters) 0   Bruce Grass (100,000 BAU/mL) 0   Red Cedar (W/F in millimeters) 0   Maple/Loving (W/F in millimeters) 5/10   Hackberry (W/F in millimeters) 3/5   Dinosaur (W/F in millimeters) 0   Lewis *ALK (W/F in millimeters) 0   American Elm (W/F in millimeters) 0   Pocahontas (W/F in millimeters) 3/5   Black [place] (W/F in millimeters) 0   Birch Mix (W/F in millimeters) 5/15   [place] (W/F in millimeters) 0   Oak (W/F in millimeters) 3/8   Cocklebur (W/F in millimeters) 0   [place] (W/F in millimeters) 0   White Mulugeta (W/F in millimeters) 0   Careless (W/F in millimeters) 0   Nettle (W/F in millimeters) 0   English Plantain (W/F in millimeters) 0   Kochia (W/F in millimeters) 0   Lamb's Quarter (W/F in millimeters) 0   Marshelder (W/F in millimeters) 5/15   Ragweed Mix* ALK (W/F in millimeters) 10/40   Russian Thistle (W/F in millimeters) 0   Sagebrush/Mugwort (W/F in millimeters) 0   Sheep Sorrel (W/F in millimeters) 3/8   Feather Mix* ALK (W/F in millimeters) 0   Penicillium Mix (1:10 w/v) 0   Curvularia spicifera (1:10 w/v) 0   Epicoccum (1:10 w/v) 0   Aspergillus fumigatus (1:10 w/v): 2/5   Alternaria tenius (1:10 w/v) 0   H. Cladosporium (1:10 w/v) 0   Phoma herbarum (1:10 w/v) 0         ASSESSMENT/PLAN:  Jacinta Robertson is a 48 year old female seen today for environmental allergies with eczema.  She had previous testing in [place].  She has not received allergy shots.  She is interested in allergy shots.  Risks and benefits were discussed.    She also has a acute episodes that last 2 to 3 minutes tonsil irritation followed by eye watering and rhinorrhea.  This happens 2-3 times a day.    The patient was counseled regarding the time commitment, billing responsibility depending on the insurance coverage (also the insurance will be billed once allergen immunotherapy serums are compounded),  auto-injectable epinephrine device policy, risks (I stated risks include hives, swelling, shortness of breath. I did state that one in 2.5 million shot administrations can result in death), and benefits of allergen immunotherapy and she wishes to proceed.  We went over the informed consent that needed to be signed, agreeing to remain in the clinic for 30 minutes after the injection.  Epipen will be prescribed if starting shots  Continue with Claritin 10 mg once to twice daily with Nasacort as needed.  Also use Alaway eyedrops as needed.  Will discuss with ENT providers if they have any thoughts on her 2 to 3-minute episodes of eye watering and nasal running.    Follow-up in 4 months      Thank you for allowing me to participate in the care of Jacinta Robertson.      I spent 40 minutes on the date of the encounter doing chart review, history and exam, documentation and further coordination as noted above exclusive of separately reported interpretations    Carlyle Jacobson MD  Allergy/Immunology  [facility]

## 2023-11-20 NOTE — PATIENT INSTRUCTIONS
The patient was counseled regarding the time commitment, billing responsibility depending on the insurance coverage (also the insurance will be billed once allergen immunotherapy serums are compounded), auto-injectable epinephrine device policy, risks (I stated risks include hives, swelling, shortness of breath. I did state that one in 2.5 million shot administrations can result in death), and benefits of allergen immunotherapy and she wishes to proceed.  We went over the informed consent that needed to be signed, agreeing to remain in the clinic for 30 minutes after the injection.  Epipen will be prescribed if starting shots  Continue with Claritin 10 mg once to twice daily with Nasacort as needed.  Also use Alaway eyedrops as needed.  Will discuss with ENT providers if they have any thoughts on her 2 to 3-minute episodes of eye watering and nasal running.

## 2023-11-20 NOTE — PROGRESS NOTES
Per provider verbal order, placed Adult Environmental Panel scratch test.  Verbal consent was obtained by MD prior to procedure.  Once panels were placed, patient was monitored for 15 minutes in clinic.  Provider read test after 15 minutes.  Pt tolerated procedure well.  All questions and concerns were addressed at office visit.     MENDOZA MastersN, RN

## 2023-11-21 ENCOUNTER — TELEPHONE (OUTPATIENT)
Dept: ALLERGY | Facility: CLINIC | Age: 48
End: 2023-11-21
Payer: COMMERCIAL

## 2023-11-27 ENCOUNTER — MYC MEDICAL ADVICE (OUTPATIENT)
Dept: ALLERGY | Facility: CLINIC | Age: 48
End: 2023-11-27
Payer: COMMERCIAL

## 2023-11-28 DIAGNOSIS — J30.1 SEASONAL ALLERGIC RHINITIS DUE TO POLLEN: Primary | ICD-10-CM

## 2023-11-28 DIAGNOSIS — J30.1 SEASONAL ALLERGIC RHINITIS DUE TO POLLEN: ICD-10-CM

## 2023-11-28 NOTE — PROGRESS NOTES
ALLERGY SOLUTION NEW REQUEST    Jacinta Robertson 1975 MRN: [id number]    DATE NEEDED:  3 weeks  Vial Color Content   Top Dose         Vial Size  Green 1:1,000, Blue 1:100, Yellow 1:10, and Red 1:1 Trees, Weeds  Red 1:1 0.5 5mL          Shot Clinic Location:  [place]  Ship to Location: [place]  Billing Location: [place]          Requester Signature  Carlyle Jacobson MD

## 2023-12-01 DIAGNOSIS — J30.1 SEASONAL ALLERGIC RHINITIS DUE TO POLLEN: Primary | ICD-10-CM

## 2023-12-01 PROCEDURE — 95165 ANTIGEN THERAPY SERVICES: CPT | Performed by: INTERNAL MEDICINE

## 2023-12-01 NOTE — PROGRESS NOTES
Allergy serums billed to [facility].     Vials billed below:    Vial Color   Content                      Vial Size Expiration Date  Green 1:1,000, Blue 1:100, Yellow 1:10, and Red 1:1  Trees, Weeds  5mL each Green 3/1/24, Blue 6/1/24, Yellow & Red 12/1/24    Billed 30 units    Checked by Puma Lim / LPN        Signature  Puma Lim LPN

## 2023-12-05 ENCOUNTER — TELEPHONE (OUTPATIENT)
Dept: ALLERGY | Facility: CLINIC | Age: 48
End: 2023-12-05
Payer: COMMERCIAL

## 2023-12-05 DIAGNOSIS — J30.1 SEASONAL ALLERGIC RHINITIS DUE TO POLLEN: Primary | ICD-10-CM

## 2023-12-05 RX ORDER — EPINEPHRINE 0.3 MG/.3ML
0.3 INJECTION SUBCUTANEOUS PRN
Qty: 2 EACH | Refills: 1 | Status: SHIPPED | OUTPATIENT
Start: 2023-12-05

## 2023-12-05 NOTE — PROGRESS NOTES
Allergy serums received at [facility].    Vials received below:    Vial Color Content                      Vial Size Expiration Date  Green 1:1,000, Blue 1:100, Yellow 1:10, and Red 1:1 Trees, Weeds 5mL  Green 03/01/24, Blue 06/01/24, Yellow and red 12/01/24    Signature  Francois Tobias MA

## 2023-12-05 NOTE — TELEPHONE ENCOUNTER
Called the patient and left voicemail with the shot room's phone number in order to help her schedule her first allergy shot appointment.      Francois Tobias MA  12/5/2023 11:09 AM

## 2023-12-05 NOTE — TELEPHONE ENCOUNTER
Relayed message from patient to Dr. Jacobson. Closing encounter.    MENDOZA SaxenaN, RN  12/5/2023 8:43 AM

## 2023-12-18 ENCOUNTER — ALLIED HEALTH/NURSE VISIT (OUTPATIENT)
Dept: ALLERGY | Facility: CLINIC | Age: 48
End: 2023-12-18
Payer: COMMERCIAL

## 2023-12-18 DIAGNOSIS — J30.1 SEASONAL ALLERGIC RHINITIS DUE TO POLLEN: Primary | ICD-10-CM

## 2023-12-18 DIAGNOSIS — J30.9 ALLERGIC RHINITIS: ICD-10-CM

## 2023-12-18 PROCEDURE — 95120 IMMUNOTHERAPY ONE INJECTION: CPT

## 2023-12-18 NOTE — PROGRESS NOTES
Jacinta Robertson presents to clinic today at the request of Carlyle Jacobson MD (ordering provider) for Allergy Immunotherapy injection(s).       This service provided today was under the care of Carlyle Jacobson MD; the supervising provider of the day; who was available if needed.      Patient presented after waiting 30 minutes with no reaction to injections. Discharged from clinic.    Mary Polanco, MENDOZAN, RN

## 2024-01-02 ENCOUNTER — ALLIED HEALTH/NURSE VISIT (OUTPATIENT)
Dept: ALLERGY | Facility: CLINIC | Age: 49
End: 2024-01-02
Payer: COMMERCIAL

## 2024-01-02 DIAGNOSIS — J30.9 ALLERGIC RHINITIS: ICD-10-CM

## 2024-01-02 DIAGNOSIS — J30.1 SEASONAL ALLERGIC RHINITIS DUE TO POLLEN: Primary | ICD-10-CM

## 2024-01-02 PROCEDURE — 95120 IMMUNOTHERAPY ONE INJECTION: CPT

## 2024-01-02 NOTE — PROGRESS NOTES
Jacinta Robertson presents to clinic today at the request of Carlyle Jacobson MD (ordering provider) for Allergy Immunotherapy injection(s).     This service provided today was under the care of David Perlman, MD; the supervising provider of the day; who was available if needed.    Patient presented after waiting 30 minutes with no reaction to  injections. Discharged from clinic.    MENDOZA ValadezN, RN

## 2024-01-05 ENCOUNTER — ALLIED HEALTH/NURSE VISIT (OUTPATIENT)
Dept: ALLERGY | Facility: CLINIC | Age: 49
End: 2024-01-05
Payer: COMMERCIAL

## 2024-01-05 DIAGNOSIS — J30.1 SEASONAL ALLERGIC RHINITIS DUE TO POLLEN: Primary | ICD-10-CM

## 2024-01-05 DIAGNOSIS — J30.9 ALLERGIC RHINITIS: ICD-10-CM

## 2024-01-05 PROCEDURE — 95120 IMMUNOTHERAPY ONE INJECTION: CPT

## 2024-01-05 NOTE — PROGRESS NOTES
Jacinta Robertson presents to clinic today at the request of Carlyle Jacobson MD (ordering provider) for Allergy Immunotherapy injection(s).     This service provided today was under the care of Jb Cheek MD; the supervising provider of the day; who was available if needed.    Patient presented after waiting 30 minutes with no reaction to  injections. Discharged from clinic.    MENDOZA ValadezN, RN

## 2024-01-08 ENCOUNTER — ALLIED HEALTH/NURSE VISIT (OUTPATIENT)
Dept: ALLERGY | Facility: CLINIC | Age: 49
End: 2024-01-08
Payer: COMMERCIAL

## 2024-01-08 DIAGNOSIS — J30.9 ALLERGIC RHINITIS: ICD-10-CM

## 2024-01-08 DIAGNOSIS — J30.1 SEASONAL ALLERGIC RHINITIS DUE TO POLLEN: Primary | ICD-10-CM

## 2024-01-08 PROCEDURE — 95120 IMMUNOTHERAPY ONE INJECTION: CPT

## 2024-01-12 ENCOUNTER — ALLIED HEALTH/NURSE VISIT (OUTPATIENT)
Dept: ALLERGY | Facility: CLINIC | Age: 49
End: 2024-01-12
Payer: COMMERCIAL

## 2024-01-12 DIAGNOSIS — J30.9 ALLERGIC RHINITIS: ICD-10-CM

## 2024-01-12 DIAGNOSIS — J30.1 SEASONAL ALLERGIC RHINITIS DUE TO POLLEN: Primary | ICD-10-CM

## 2024-01-12 PROCEDURE — 95120 IMMUNOTHERAPY ONE INJECTION: CPT

## 2024-01-12 NOTE — PROGRESS NOTES
Jacinta Rboertson presents to clinic today at the request of Carlyle Jacobson MD (ordering provider) for Allergy Immunotherapy injection(s).       This service provided today was under the care of Carlyle Jacobson MD; the supervising provider of the day; who was available if needed.      Patient presented after waiting 30 minutes with no reaction to injections. Discharged from clinic.    Mary Polanco, MENDOZAN, RN

## 2024-01-16 ENCOUNTER — ALLIED HEALTH/NURSE VISIT (OUTPATIENT)
Dept: ALLERGY | Facility: CLINIC | Age: 49
End: 2024-01-16
Payer: COMMERCIAL

## 2024-01-16 DIAGNOSIS — J30.9 ALLERGIC RHINITIS: ICD-10-CM

## 2024-01-16 DIAGNOSIS — J30.1 SEASONAL ALLERGIC RHINITIS DUE TO POLLEN: Primary | ICD-10-CM

## 2024-01-16 PROCEDURE — 95120 IMMUNOTHERAPY ONE INJECTION: CPT

## 2024-01-16 NOTE — PROGRESS NOTES
Jacinta Robertson presents to clinic today at the request of Carlyle Jacobson MD (ordering provider) for Allergy Immunotherapy injection(s).     This service provided today was under the care of Carlyle Jacobson MD; the supervising provider of the day; who was available if needed.    Patient presented after waiting 30 minutes with no reaction to  injections. Discharged from clinic.    MENDOZA ValadezN, RN

## 2024-01-19 ENCOUNTER — ALLIED HEALTH/NURSE VISIT (OUTPATIENT)
Dept: ALLERGY | Facility: CLINIC | Age: 49
End: 2024-01-19
Payer: COMMERCIAL

## 2024-01-19 DIAGNOSIS — J30.9 ALLERGIC RHINITIS: ICD-10-CM

## 2024-01-19 DIAGNOSIS — J30.1 SEASONAL ALLERGIC RHINITIS DUE TO POLLEN: Primary | ICD-10-CM

## 2024-01-19 PROCEDURE — 95120 IMMUNOTHERAPY ONE INJECTION: CPT

## 2024-01-19 NOTE — PROGRESS NOTES
Jacinta Robertson presents to clinic today at the request of Carlyle Jaocbson MD (ordering provider) for Allergy Immunotherapy injection(s).       This service provided today was under the care of Carlyle Jacobson MD; the supervising provider of the day; who was available if needed.      Patient presented after waiting 30 minutes with no reaction to injections. Discharged from clinic.    Mary Polanco, MENDOZAN, RN

## 2024-01-22 ENCOUNTER — ALLIED HEALTH/NURSE VISIT (OUTPATIENT)
Dept: ALLERGY | Facility: CLINIC | Age: 49
End: 2024-01-22
Payer: COMMERCIAL

## 2024-01-22 DIAGNOSIS — J30.1 SEASONAL ALLERGIC RHINITIS DUE TO POLLEN: Primary | ICD-10-CM

## 2024-01-22 DIAGNOSIS — J30.9 ALLERGIC RHINITIS: ICD-10-CM

## 2024-01-22 PROCEDURE — 95120 IMMUNOTHERAPY ONE INJECTION: CPT

## 2024-01-22 NOTE — PROGRESS NOTES
Jacinta Robertson presents to clinic today at the request of Carlyle Jacobson MD (ordering provider) for Allergy Immunotherapy injection(s).       This service provided today was under the care of Carlyle Jacobson MD; the supervising provider of the day; who was available if needed.      Patient presented after waiting 30 minutes with no reaction to injections. Discharged from clinic.    Mary Polanco, RN, BSN

## 2024-01-26 ENCOUNTER — ALLIED HEALTH/NURSE VISIT (OUTPATIENT)
Dept: ALLERGY | Facility: CLINIC | Age: 49
End: 2024-01-26
Payer: COMMERCIAL

## 2024-01-26 DIAGNOSIS — J30.9 ALLERGIC RHINITIS: ICD-10-CM

## 2024-01-26 DIAGNOSIS — J30.1 SEASONAL ALLERGIC RHINITIS DUE TO POLLEN: Primary | ICD-10-CM

## 2024-01-26 PROCEDURE — 95120 IMMUNOTHERAPY ONE INJECTION: CPT

## 2024-01-26 NOTE — PROGRESS NOTES
Jacinta Robertson presents to clinic today at the request of Carlyle Jacobson MD (ordering provider) for Allergy Immunotherapy injection(s).       This service provided today was under the care of Jb Cheek MD; the supervising provider of the day; who was available if needed.      Patient presented after waiting 30 minutes with no reaction to injections. Discharged from clinic.    Mary Polanco, RN, BSN

## 2024-01-29 ENCOUNTER — ALLIED HEALTH/NURSE VISIT (OUTPATIENT)
Dept: ALLERGY | Facility: CLINIC | Age: 49
End: 2024-01-29
Payer: COMMERCIAL

## 2024-01-29 DIAGNOSIS — J30.1 SEASONAL ALLERGIC RHINITIS DUE TO POLLEN: Primary | ICD-10-CM

## 2024-01-29 DIAGNOSIS — J30.9 ALLERGIC RHINITIS: ICD-10-CM

## 2024-01-29 PROCEDURE — 95120 IMMUNOTHERAPY ONE INJECTION: CPT

## 2024-02-02 ENCOUNTER — ALLIED HEALTH/NURSE VISIT (OUTPATIENT)
Dept: ALLERGY | Facility: CLINIC | Age: 49
End: 2024-02-02
Payer: COMMERCIAL

## 2024-02-02 DIAGNOSIS — J30.1 SEASONAL ALLERGIC RHINITIS DUE TO POLLEN: Primary | ICD-10-CM

## 2024-02-02 DIAGNOSIS — J30.9 ALLERGIC RHINITIS: ICD-10-CM

## 2024-02-02 PROCEDURE — 95120 IMMUNOTHERAPY ONE INJECTION: CPT

## 2024-02-02 NOTE — PROGRESS NOTES
Jacinta Robertson presents to clinic today at the request of Carlyle Jacobson MD (ordering provider) for Allergy Immunotherapy injection(s).       This service provided today was under the care of Carlyle Jacobson MD; the supervising provider of the day; who was available if needed.      Patient presented after waiting 30 minutes with no reaction to  injections. Discharged from clinic.    aRdha Harrington RN

## 2024-02-05 ENCOUNTER — ALLIED HEALTH/NURSE VISIT (OUTPATIENT)
Dept: ALLERGY | Facility: CLINIC | Age: 49
End: 2024-02-05
Payer: COMMERCIAL

## 2024-02-05 DIAGNOSIS — J30.1 SEASONAL ALLERGIC RHINITIS DUE TO POLLEN: Primary | ICD-10-CM

## 2024-02-05 DIAGNOSIS — J30.9 ALLERGIC RHINITIS: ICD-10-CM

## 2024-02-05 PROCEDURE — 95120 IMMUNOTHERAPY ONE INJECTION: CPT

## 2024-02-05 NOTE — PROGRESS NOTES
Jacinta Robertson presents to clinic today at the request of Carlyle Jacobson MD (ordering provider) for Allergy Immunotherapy injection(s).       This service provided today was under the care of Lore Young MD; the supervising provider of the day; who was available if needed.      Patient presented after waiting 30 minutes with no reaction to injections. Discharged from clinic.    Mary Polanco, RN, BSN

## 2024-02-12 ENCOUNTER — ALLIED HEALTH/NURSE VISIT (OUTPATIENT)
Dept: ALLERGY | Facility: CLINIC | Age: 49
End: 2024-02-12
Payer: COMMERCIAL

## 2024-02-12 DIAGNOSIS — J30.1 SEASONAL ALLERGIC RHINITIS DUE TO POLLEN: Primary | ICD-10-CM

## 2024-02-12 DIAGNOSIS — J30.9 ALLERGIC RHINITIS: ICD-10-CM

## 2024-02-12 PROCEDURE — 95120 IMMUNOTHERAPY ONE INJECTION: CPT

## 2024-02-16 ENCOUNTER — ALLIED HEALTH/NURSE VISIT (OUTPATIENT)
Dept: ALLERGY | Facility: CLINIC | Age: 49
End: 2024-02-16
Payer: COMMERCIAL

## 2024-02-16 DIAGNOSIS — J30.9 ALLERGIC RHINITIS: ICD-10-CM

## 2024-02-16 DIAGNOSIS — J30.1 SEASONAL ALLERGIC RHINITIS DUE TO POLLEN: Primary | ICD-10-CM

## 2024-02-16 PROCEDURE — 95120 IMMUNOTHERAPY ONE INJECTION: CPT

## 2024-02-16 NOTE — PROGRESS NOTES
Jacinta Robertson presents to clinic today at the request of Carlyle Jacobson MD (ordering provider) for Allergy Immunotherapy injection(s).       This service provided today was under the care of Carlyle Jacobson MD; the supervising provider of the day; who was available if needed.      Patient presented after waiting 30 minutes with no reaction to  injections. Discharged from clinic.    Radha Harrington, EMNDOZA ManuelN, RN

## 2024-02-20 ENCOUNTER — ALLIED HEALTH/NURSE VISIT (OUTPATIENT)
Dept: ALLERGY | Facility: CLINIC | Age: 49
End: 2024-02-20
Payer: COMMERCIAL

## 2024-02-20 DIAGNOSIS — J30.9 ALLERGIC RHINITIS: ICD-10-CM

## 2024-02-20 DIAGNOSIS — J30.1 SEASONAL ALLERGIC RHINITIS DUE TO POLLEN: Primary | ICD-10-CM

## 2024-02-20 PROCEDURE — 95120 IMMUNOTHERAPY ONE INJECTION: CPT

## 2024-02-27 ENCOUNTER — ALLIED HEALTH/NURSE VISIT (OUTPATIENT)
Dept: ALLERGY | Facility: CLINIC | Age: 49
End: 2024-02-27
Payer: COMMERCIAL

## 2024-02-27 DIAGNOSIS — J30.9 ALLERGIC RHINITIS: ICD-10-CM

## 2024-02-27 DIAGNOSIS — J30.1 SEASONAL ALLERGIC RHINITIS DUE TO POLLEN: Primary | ICD-10-CM

## 2024-02-27 PROCEDURE — 95120 IMMUNOTHERAPY ONE INJECTION: CPT

## 2024-03-01 ENCOUNTER — ALLIED HEALTH/NURSE VISIT (OUTPATIENT)
Dept: ALLERGY | Facility: CLINIC | Age: 49
End: 2024-03-01
Payer: COMMERCIAL

## 2024-03-01 DIAGNOSIS — J30.1 SEASONAL ALLERGIC RHINITIS DUE TO POLLEN: Primary | ICD-10-CM

## 2024-03-01 DIAGNOSIS — J30.9 ALLERGIC RHINITIS: ICD-10-CM

## 2024-03-01 PROCEDURE — 95120 IMMUNOTHERAPY ONE INJECTION: CPT

## 2024-03-04 ENCOUNTER — ALLIED HEALTH/NURSE VISIT (OUTPATIENT)
Dept: ALLERGY | Facility: CLINIC | Age: 49
End: 2024-03-04
Payer: COMMERCIAL

## 2024-03-04 DIAGNOSIS — J30.1 SEASONAL ALLERGIC RHINITIS DUE TO POLLEN: Primary | ICD-10-CM

## 2024-03-04 DIAGNOSIS — J30.9 ALLERGIC RHINITIS: ICD-10-CM

## 2024-03-04 PROCEDURE — 95120 IMMUNOTHERAPY ONE INJECTION: CPT

## 2024-03-11 ENCOUNTER — ALLIED HEALTH/NURSE VISIT (OUTPATIENT)
Dept: ALLERGY | Facility: CLINIC | Age: 49
End: 2024-03-11
Payer: COMMERCIAL

## 2024-03-11 DIAGNOSIS — J30.9 ALLERGIC RHINITIS: ICD-10-CM

## 2024-03-11 DIAGNOSIS — J30.1 SEASONAL ALLERGIC RHINITIS DUE TO POLLEN: Primary | ICD-10-CM

## 2024-03-11 PROCEDURE — 95120 IMMUNOTHERAPY ONE INJECTION: CPT

## 2024-03-11 NOTE — PROGRESS NOTES
Jacinta Robertson presents to clinic today at the request of Carlyle Jacobson MD (ordering provider) for Allergy Immunotherapy injection(s).       This service provided today was under the care of Carlyle aJcobson MD; the supervising provider of the day; who was available if needed.      Patient presented after waiting 30 minutes with no reaction to injections. Discharged from clinic.    Mary Polanco, MENDOZAN, RN

## 2024-03-15 ENCOUNTER — ALLIED HEALTH/NURSE VISIT (OUTPATIENT)
Dept: ALLERGY | Facility: CLINIC | Age: 49
End: 2024-03-15
Payer: COMMERCIAL

## 2024-03-15 DIAGNOSIS — J30.9 ALLERGIC RHINITIS: ICD-10-CM

## 2024-03-15 DIAGNOSIS — J30.1 SEASONAL ALLERGIC RHINITIS DUE TO POLLEN: Primary | ICD-10-CM

## 2024-03-15 PROCEDURE — 95120 IMMUNOTHERAPY ONE INJECTION: CPT

## 2024-03-18 ENCOUNTER — ALLIED HEALTH/NURSE VISIT (OUTPATIENT)
Dept: ALLERGY | Facility: CLINIC | Age: 49
End: 2024-03-18
Payer: COMMERCIAL

## 2024-03-18 DIAGNOSIS — J30.1 SEASONAL ALLERGIC RHINITIS DUE TO POLLEN: Primary | ICD-10-CM

## 2024-03-18 DIAGNOSIS — J30.9 ALLERGIC RHINITIS: ICD-10-CM

## 2024-03-18 PROCEDURE — 95120 IMMUNOTHERAPY ONE INJECTION: CPT

## 2024-03-18 NOTE — PROGRESS NOTES
Jacinta Robertson presents to clinic today at the request of Carlyle Jacobson MD (ordering provider) for Allergy Immunotherapy injection(s).       This service provided today was under the care of Carlyle Jacobson MD; the supervising provider of the day; who was available if needed.      Patient presented after waiting 30 minutes with no reaction to  injections. Discharged from clinic.    Ingrid Peres RN

## 2024-03-21 ENCOUNTER — OFFICE VISIT (OUTPATIENT)
Dept: ALLERGY | Facility: CLINIC | Age: 49
End: 2024-03-21
Attending: INTERNAL MEDICINE
Payer: COMMERCIAL

## 2024-03-21 VITALS
HEART RATE: 78 BPM | WEIGHT: 219.8 LBS | DIASTOLIC BLOOD PRESSURE: 86 MMHG | OXYGEN SATURATION: 98 % | SYSTOLIC BLOOD PRESSURE: 122 MMHG | BODY MASS INDEX: 37.44 KG/M2

## 2024-03-21 DIAGNOSIS — R09.81 NASAL CONGESTION: ICD-10-CM

## 2024-03-21 DIAGNOSIS — J30.1 SEASONAL ALLERGIC RHINITIS DUE TO POLLEN: ICD-10-CM

## 2024-03-21 DIAGNOSIS — L30.9 ECZEMA, UNSPECIFIED TYPE: ICD-10-CM

## 2024-03-21 PROCEDURE — 99213 OFFICE O/P EST LOW 20 MIN: CPT | Performed by: INTERNAL MEDICINE

## 2024-03-21 NOTE — LETTER
3/21/2024         RE: Jacinta Robertson  [address] [id number]        Dear Colleague,    Thank you for referring your patient, Jacinta Robertson, to the [facility] [place]. Please see a copy of my visit note below.    Jacinta Robertson was seen in the Allergy Clinic at [facility].    Jacinta Robertson is a 49 year old female being seen today for ongoing evaluation of allergic rhinitis with history of eczema.  She is receiving allergy immunotherapy.    Since the last visit the patient has been doing well.    In the winter she was able to stop taking allergy medications.  In the last month or so she started taking Claritin daily.  She does take Claritin before allergy shots also.  Without Claritin over the last week she has had increased allergy symptoms.  She uses Nasacort daily as of last week.    She started allergy shots 2023.    Fall or ragweed season is typically the worst time of the year for her.    She is now icing her arm after allergy shots which is providing some benefit.  She did get a small welt on her arm after one of the injections.  When she did not take an antihistamine prior to a shot, she did have some mild sneezing and postnasal drainage.        Past Medical History:   Diagnosis Date     Class 1 obesity due to excess calories without serious comorbidity with body mass index (BMI) of 34.0 to 34.9 in adult 12/10/2018     Hx of major depression      Hypertension      Obesity (BMI 30-39.9) 3/29/2013     SHAILA (obstructive sleep apnea)     CPAP     Seasonal allergies      Family History   Problem Relation Age of Onset     Hypertension Mother          age 71     C.A.D. Mother      Hypertension Father      Lipids Father      Hyperlipidemia Father      Breast Cancer Paternal Aunt         Cousin as well     Hypertension Maternal Half-Sister      Breast Cancer Other         pat cousins     Past Surgical History:    Procedure Laterality Date     BUNIONECTOMY  2003    bilateral     ORTHOPEDIC SURGERY  2003    bilateral bunionectomy     [facility] ORAL SURGERY PROCEDURE      wisdom teeth         Current Outpatient Medications:      Cholecalciferol (VITAMIN D-3 PO), Take 10,000 Units by mouth, Disp: , Rfl:      Digestive Enzyme CAPS, , Disp:  , Rfl:      EPINEPHrine (ANY BX GENERIC EQUIV) 0.3 MG/0.3ML injection 2-pack, Inject 0.3 mLs (0.3 mg) into the muscle as needed for anaphylaxis (for allergy immunotherapy patients per [place] policy) May repeat one time in 5-15 minutes if response to initial dose is inadequate., Disp: 2 each, Rfl: 1     lisinopril (ZESTRIL) 20 MG tablet, Take 1 tablet (20 mg) by mouth daily, Disp: 90 tablet, Rfl: 3     loratadine (CLARITIN) 10 MG tablet, Take 10 mg by mouth, Disp: , Rfl:      MAGNESIUM GLYCINATE PO, Take 100 mg by mouth daily, Disp: , Rfl:      multivitamin (ONE-DAILY) tablet, Take 1 tablet by mouth daily, Disp:  , Rfl:      Omega-3 Fatty Acids (FISH OIL) 1200 MG capsule, Take 1,200 mg by mouth daily DHA + EPA, Disp: , Rfl:      ORDER FOR ALLERGEN IMMUNOTHERAPY, Name of Mix: Mix #1  Tree , Weeds Birch Mix PRW 1:20 w/v, HS  0.5 ml Boxelder-Maple Mix BHR (Boxelder Hard Red) 1:20 w/v, HS  0.5 ml Hale, Common 1:20 w/v, HS  0.5 ml Hackberry 1:20 w/v, HS 0.5 ml Oak Mix RVW 1:20 w/v, HS 0.5 ml Marshelder-Povertyweed 1:20 w/v, HS 0.5 ml Ragweed, Mix (Giant, Short) 1:20 w/v, HS 0.5 ml Sorrel, Sheep 1:20 w/v, HS 0.5 ml  Diluent: HSA 1.0 ml to 5ml, Disp: 5 mL, Rfl: PRN     Probiotic Product (PROBIOTIC DAILY PO), , Disp: , Rfl:      progesterone (PROMETRIUM) 100 MG capsule, Take 300 mg by mouth daily, Disp: , Rfl:      Sharps Container (SHARPS-A-GATOR LOCKING BRACKET) MISC, As directed. CPAP, heated humidifier, mask, headgear, filters and tubing. For home use. Pressure: **   Length of Need: **, Disp: , Rfl:      UNABLE TO FIND, MEDICATION NAME: OTC allergy eye drop, Disp: , Rfl:      triamcinolone  (NASACORT AQ) 55 MCG/ACT nasal inhaler, Spray 2 sprays into both nostrils daily. (Patient not taking: Reported on 11/20/2023), Disp: 3 Inhaler, Rfl: 3  No Known Allergies      EXAM:   /86   Pulse 78   Wt 99.7 kg (219 lb 12.8 oz)   SpO2 98%   BMI 37.44 kg/m      Constitutional:       General: She is not in acute distress.     Appearance: Normal appearance. She is not ill-appearing.   HENT:      Head: Normocephalic and atraumatic.      Nose: Nose normal. No congestion or rhinorrhea.      Mouth/Throat:      Mouth: Mucous membranes are moist.      Pharynx: Oropharynx is clear. No posterior oropharyngeal erythema.   Eyes:      General:         Right eye: No discharge.         Left eye: No discharge.   Cardiovascular:      Rate and Rhythm: Normal rate and regular rhythm.      Heart sounds: Normal heart sounds.   Pulmonary:      Effort: Pulmonary effort is normal.      Breath sounds: Normal breath sounds. No wheezing or rhonchi.   Skin:     General: Skin is warm.      Findings: No erythema or rash.   Neurological:      General: No focal deficit present.      Mental Status: She is alert. Mental status is at baseline.   Psychiatric:         Mood and Affect: Mood normal.         Behavior: Behavior normal.        ASSESSMENT/PLAN:  Jacinta Robertson is a 49 year old female seen today for ongoing valuation of allergic rhinitis.  Doing well with immunotherapy.  Mild local reactions only.  She will continue to take antihistamines prior to her injections.    She is receiving clinical benefit from the allergy immunotherapy.  Will see how she responds this fall during the worst time of the year.  Will follow-up in the fall.  She will continue with the Claritin and Nasacort.  Pataday as needed.  She is in agreement with continuation of the allergy immunotherapy.  She has an EpiPen    Follow-up in 5 months.      Thank you for allowing me to participate in the care of Jacinta Robertson.      I spent 20 minutes on the date of  the encounter doing chart review, history and exam, documentation and further coordination as noted above exclusive of separately reported interpretations    Carlyle Jacobson MD  Allergy/Immunology  [facility]      Again, thank you for allowing me to participate in the care of your patient.        Sincerely,        Carlyle Jacobson MD

## 2024-03-21 NOTE — PROGRESS NOTES
Jacinta Robertson was seen in the Allergy Clinic at [facility].    Jacinta Robertson is a 49 year old female being seen today for ongoing evaluation of allergic rhinitis with history of eczema.  She is receiving allergy immunotherapy.    Since the last visit the patient has been doing well.    In the winter she was able to stop taking allergy medications.  In the last month or so she started taking Claritin daily.  She does take Claritin before allergy shots also.  Without Claritin over the last week she has had increased allergy symptoms.  She uses Nasacort daily as of last week.    She started allergy shots 2023.    Fall or ragweed season is typically the worst time of the year for her.    She is now icing her arm after allergy shots which is providing some benefit.  She did get a small welt on her arm after one of the injections.  When she did not take an antihistamine prior to a shot, she did have some mild sneezing and postnasal drainage.        Past Medical History:   Diagnosis Date    Class 1 obesity due to excess calories without serious comorbidity with body mass index (BMI) of 34.0 to 34.9 in adult 12/10/2018    Hx of major depression     Hypertension     Obesity (BMI 30-39.9) 3/29/2013    SHAILA (obstructive sleep apnea)     CPAP    Seasonal allergies      Family History   Problem Relation Age of Onset    Hypertension Mother          age 71    C.A.D. Mother     Hypertension Father     Lipids Father     Hyperlipidemia Father     Breast Cancer Paternal Aunt         Cousin as well    Hypertension Maternal Half-Sister     Breast Cancer Other         pat cousins     Past Surgical History:   Procedure Laterality Date    BUNIONECTOMY      bilateral    ORTHOPEDIC SURGERY  2003    bilateral bunionectomy    ZZ ORAL SURGERY PROCEDURE      wisdom teeth         Current Outpatient Medications:     Cholecalciferol (VITAMIN D-3 PO), Take 10,000 Units by mouth, Disp: , Rfl:      Digestive Enzyme CAPS, , Disp:  , Rfl:     EPINEPHrine (ANY BX GENERIC EQUIV) 0.3 MG/0.3ML injection 2-pack, Inject 0.3 mLs (0.3 mg) into the muscle as needed for anaphylaxis (for allergy immunotherapy patients per [place] policy) May repeat one time in 5-15 minutes if response to initial dose is inadequate., Disp: 2 each, Rfl: 1    lisinopril (ZESTRIL) 20 MG tablet, Take 1 tablet (20 mg) by mouth daily, Disp: 90 tablet, Rfl: 3    loratadine (CLARITIN) 10 MG tablet, Take 10 mg by mouth, Disp: , Rfl:     MAGNESIUM GLYCINATE PO, Take 100 mg by mouth daily, Disp: , Rfl:     multivitamin (ONE-DAILY) tablet, Take 1 tablet by mouth daily, Disp:  , Rfl:     Omega-3 Fatty Acids (FISH OIL) 1200 MG capsule, Take 1,200 mg by mouth daily DHA + EPA, Disp: , Rfl:     ORDER FOR ALLERGEN IMMUNOTHERAPY, Name of Mix: Mix #1  Tree , Weeds Birch Mix PRW 1:20 w/v, HS  0.5 ml Boxelder-Maple Mix BHR (Boxelder Hard Red) 1:20 w/v, HS  0.5 ml Gregory, Common 1:20 w/v, HS  0.5 ml Hackberry 1:20 w/v, HS 0.5 ml Oak Mix RVW 1:20 w/v, HS 0.5 ml Marshelder-Povertyweed 1:20 w/v, HS 0.5 ml Ragweed, Mix (Giant, Short) 1:20 w/v, HS 0.5 ml Sorrel, Sheep 1:20 w/v, HS 0.5 ml  Diluent: HSA 1.0 ml to 5ml, Disp: 5 mL, Rfl: PRN    Probiotic Product (PROBIOTIC DAILY PO), , Disp: , Rfl:     progesterone (PROMETRIUM) 100 MG capsule, Take 300 mg by mouth daily, Disp: , Rfl:     Sharps Container (SHARPS-A-GATOR LOCKING BRACKET) MISC, As directed. CPAP, heated humidifier, mask, headgear, filters and tubing. For home use. Pressure: **   Length of Need: **, Disp: , Rfl:     UNABLE TO FIND, MEDICATION NAME: OTC allergy eye drop, Disp: , Rfl:     triamcinolone (NASACORT AQ) 55 MCG/ACT nasal inhaler, Spray 2 sprays into both nostrils daily. (Patient not taking: Reported on 11/20/2023), Disp: 3 Inhaler, Rfl: 3  No Known Allergies      EXAM:   /86   Pulse 78   Wt 99.7 kg (219 lb 12.8 oz)   SpO2 98%   BMI 37.44 kg/m      Constitutional:       General: She is  not in acute distress.     Appearance: Normal appearance. She is not ill-appearing.   HENT:      Head: Normocephalic and atraumatic.      Nose: Nose normal. No congestion or rhinorrhea.      Mouth/Throat:      Mouth: Mucous membranes are moist.      Pharynx: Oropharynx is clear. No posterior oropharyngeal erythema.   Eyes:      General:         Right eye: No discharge.         Left eye: No discharge.   Cardiovascular:      Rate and Rhythm: Normal rate and regular rhythm.      Heart sounds: Normal heart sounds.   Pulmonary:      Effort: Pulmonary effort is normal.      Breath sounds: Normal breath sounds. No wheezing or rhonchi.   Skin:     General: Skin is warm.      Findings: No erythema or rash.   Neurological:      General: No focal deficit present.      Mental Status: She is alert. Mental status is at baseline.   Psychiatric:         Mood and Affect: Mood normal.         Behavior: Behavior normal.        ASSESSMENT/PLAN:  Jacinta Robertson is a 49 year old female seen today for ongoing valuation of allergic rhinitis.  Doing well with immunotherapy.  Mild local reactions only.  She will continue to take antihistamines prior to her injections.    She is receiving clinical benefit from the allergy immunotherapy.  Will see how she responds this fall during the worst time of the year.  Will follow-up in the fall.  She will continue with the Claritin and Nasacort.  Pataday as needed.  She is in agreement with continuation of the allergy immunotherapy.  She has an EpiPen    Follow-up in 5 months.      Thank you for allowing me to participate in the care of Jacinta Robertson.      I spent 20 minutes on the date of the encounter doing chart review, history and exam, documentation and further coordination as noted above exclusive of separately reported interpretations    Carlyle Jacobson MD  Allergy/Immunology  [facility]

## 2024-03-21 NOTE — PATIENT INSTRUCTIONS
Allergy Staff Appt Hours Shot Hours Location       Physician   Carlyle Jacobson MD      Support Staff   CARMEN Euceda MA Emily J., MA      Mondays Tuesdays Thursdays and Fridays:      Esther 7-5      Wednesdays         Close                Mondays, Tuesdays and Fridays:  7:20 - 3:40              [facility]  [address]  [place] [address]  Allergy appointment  line: [phone]    Pulmonary Function Scheduling:  [place]: [phone]           Questions about cost of your care  For questions about your cost of your visit, procedure, lab or imaging contact: [employer] Price Line [phone] or visit:  [email or web address]    Prescription Assistance  If you need assistance with your prescriptions (cost, coverage, etc) please contact: [email or web address] Prescription Assistance Program [phone]    Important Scheduling Information  All visits for food challenges, medication/drug allergy testing, and drug challenges MUST be scheduled through the allergy clinic nurse. Please contact them via [employer] or by calling the clinic at [phone] and asking to speak with an allergy nurse. They will provide additional information and instructions for the appointment. Discontinue oral antihistamines 7 days prior to the appointment. Discontinue nasal and ocular antihistamines 1 day prior to the appointment.    Appointments for skin testing: Appointment will last approximately 45 minutes.  Please call the appointment line for your clinic to schedule.  Discontinue oral antihistamines 7 days prior to the appointment.  Discontinue nasal and ocular antihistamines 1 days prior to appointment.    Thank you for trusting us with your care. Please feel free to contact us with any questions or concerns you may have.

## 2024-03-22 ENCOUNTER — ALLIED HEALTH/NURSE VISIT (OUTPATIENT)
Dept: ALLERGY | Facility: CLINIC | Age: 49
End: 2024-03-22
Payer: COMMERCIAL

## 2024-03-22 DIAGNOSIS — J30.1 SEASONAL ALLERGIC RHINITIS DUE TO POLLEN: Primary | ICD-10-CM

## 2024-03-22 DIAGNOSIS — J30.9 ALLERGIC RHINITIS: ICD-10-CM

## 2024-03-22 PROCEDURE — 95120 IMMUNOTHERAPY ONE INJECTION: CPT

## 2024-03-25 ENCOUNTER — ALLIED HEALTH/NURSE VISIT (OUTPATIENT)
Dept: ALLERGY | Facility: CLINIC | Age: 49
End: 2024-03-25
Payer: COMMERCIAL

## 2024-03-25 DIAGNOSIS — J30.9 ALLERGIC RHINITIS: ICD-10-CM

## 2024-03-25 DIAGNOSIS — J30.1 SEASONAL ALLERGIC RHINITIS DUE TO POLLEN: Primary | ICD-10-CM

## 2024-03-25 PROCEDURE — 95120 IMMUNOTHERAPY ONE INJECTION: CPT

## 2024-03-29 ENCOUNTER — ALLIED HEALTH/NURSE VISIT (OUTPATIENT)
Dept: ALLERGY | Facility: CLINIC | Age: 49
End: 2024-03-29
Payer: COMMERCIAL

## 2024-03-29 DIAGNOSIS — J30.9 ALLERGIC RHINITIS: ICD-10-CM

## 2024-03-29 DIAGNOSIS — J30.1 SEASONAL ALLERGIC RHINITIS DUE TO POLLEN: Primary | ICD-10-CM

## 2024-03-29 PROCEDURE — 95120 IMMUNOTHERAPY ONE INJECTION: CPT

## 2024-03-29 NOTE — PROGRESS NOTES
Jacinta Robertson presents to clinic today at the request of Carlyle Jacobson MD (ordering provider) for Allergy Immunotherapy injection(s).       This service provided today was under the care of Jb Cheek MD; the supervising provider of the day; who was available if needed.      Patient presented after waiting 30 minutes with no reaction to injections. Discharged from clinic.    Mary Polanco, MENDOZAN, RN

## 2024-04-01 DIAGNOSIS — J30.1 SEASONAL ALLERGIC RHINITIS DUE TO POLLEN: ICD-10-CM

## 2024-04-01 NOTE — TELEPHONE ENCOUNTER
ALLERGY SOLUTION RE-ORDER REQUEST    Jacinta Robertson 1975 MRN: [id number]    DATE NEEDED:  5/1/2024  Vial Color Content    Vial Size  Red 1:1 Trees, Weeds    5 mL        Serum reorder consent signed and patient/parent was advised that new serums would be ordered through the pharmacy and billed to their insurance company when they arrive in clinic. Yes    Shot Clinic Location:  [facility].  Ship to Location: [facility].  Serum billed to:  [facility].    Special Instructions:  N/A        Requester Signature  Mary Polanco RN

## 2024-04-04 DIAGNOSIS — J30.1 SEASONAL ALLERGIC RHINITIS DUE TO POLLEN: Primary | ICD-10-CM

## 2024-04-04 PROCEDURE — 95165 ANTIGEN THERAPY SERVICES: CPT | Performed by: INTERNAL MEDICINE

## 2024-04-04 NOTE — PROGRESS NOTES
Allergy serums billed to [facility].     Vials billed below:    Vial Color Content                      Vial Size Expiration Date  Red 1:1 Trees, Weeds 5mL  4/4/25      Billed 10 units    Checked by Puma Lim / LPN        Signature  Puma Lim LPN

## 2024-04-05 ENCOUNTER — ALLIED HEALTH/NURSE VISIT (OUTPATIENT)
Dept: ALLERGY | Facility: CLINIC | Age: 49
End: 2024-04-05
Payer: COMMERCIAL

## 2024-04-05 DIAGNOSIS — J30.1 SEASONAL ALLERGIC RHINITIS DUE TO POLLEN: Primary | ICD-10-CM

## 2024-04-05 DIAGNOSIS — J30.9 ALLERGIC RHINITIS: ICD-10-CM

## 2024-04-05 PROCEDURE — 95120 IMMUNOTHERAPY ONE INJECTION: CPT

## 2024-04-08 ENCOUNTER — ALLIED HEALTH/NURSE VISIT (OUTPATIENT)
Dept: ALLERGY | Facility: CLINIC | Age: 49
End: 2024-04-08
Payer: COMMERCIAL

## 2024-04-08 DIAGNOSIS — J30.1 SEASONAL ALLERGIC RHINITIS DUE TO POLLEN: Primary | ICD-10-CM

## 2024-04-08 DIAGNOSIS — J30.9 ALLERGIC RHINITIS: ICD-10-CM

## 2024-04-08 PROCEDURE — 95120 IMMUNOTHERAPY ONE INJECTION: CPT

## 2024-04-08 NOTE — PROGRESS NOTES
Jacinta Robertson presents to clinic today at the request of Carlyle Jacobson MD (ordering provider) for Allergy Immunotherapy injection(s).       This service provided today was under the care of Lore Young MD; the supervising provider of the day; who was available if needed.      Patient presented after waiting 30 minutes with no reaction to injections. Discharged from clinic.    Mary Polanco, MENDOZAN, RN

## 2024-04-11 NOTE — PROGRESS NOTES
Allergy serums received at [facility].    Vials received below:       Vial Color                               Content                      Vial Size         Expiration Date  Red 1:1                                   Trees, Weeds             5mL                 4/4/25       Signature  Francois Tobias MA

## 2024-04-22 ENCOUNTER — ALLIED HEALTH/NURSE VISIT (OUTPATIENT)
Dept: ALLERGY | Facility: CLINIC | Age: 49
End: 2024-04-22
Payer: COMMERCIAL

## 2024-04-22 DIAGNOSIS — J30.1 SEASONAL ALLERGIC RHINITIS DUE TO POLLEN: Primary | ICD-10-CM

## 2024-04-22 DIAGNOSIS — J30.9 ALLERGIC RHINITIS: ICD-10-CM

## 2024-04-22 PROCEDURE — 95120 IMMUNOTHERAPY ONE INJECTION: CPT

## 2024-04-22 NOTE — PROGRESS NOTES
Jacinta Robertson presents to clinic today at the request of Carlyle Jacobson MD (ordering provider) for Allergy Immunotherapy injection(s).       This service provided today was under the care of Carlyle Jacobson MD; the supervising provider of the day; who was available if needed.      Patient presented after waiting 30 minutes with no reaction to  injections. Discharged from clinic.    Radha Harrington RN

## 2024-05-20 ENCOUNTER — ALLIED HEALTH/NURSE VISIT (OUTPATIENT)
Dept: ALLERGY | Facility: CLINIC | Age: 49
End: 2024-05-20
Payer: COMMERCIAL

## 2024-05-20 DIAGNOSIS — J30.1 SEASONAL ALLERGIC RHINITIS DUE TO POLLEN: Primary | ICD-10-CM

## 2024-05-20 DIAGNOSIS — J30.9 ALLERGIC RHINITIS: ICD-10-CM

## 2024-05-20 PROCEDURE — 95120 IMMUNOTHERAPY ONE INJECTION: CPT

## 2024-05-31 DIAGNOSIS — I10 ESSENTIAL HYPERTENSION: ICD-10-CM

## 2024-05-31 RX ORDER — LISINOPRIL 20 MG/1
20 TABLET ORAL DAILY
Qty: 90 TABLET | Refills: 0 | Status: SHIPPED | OUTPATIENT
Start: 2024-05-31 | End: 2024-07-08

## 2024-06-10 ENCOUNTER — TRANSFERRED RECORDS (OUTPATIENT)
Dept: MULTI SPECIALTY CLINIC | Facility: CLINIC | Age: 49
End: 2024-06-10

## 2024-06-10 LAB — PAP SMEAR - HIM PATIENT REPORTED: NORMAL

## 2024-06-17 ENCOUNTER — ALLIED HEALTH/NURSE VISIT (OUTPATIENT)
Dept: ALLERGY | Facility: CLINIC | Age: 49
End: 2024-06-17
Payer: COMMERCIAL

## 2024-06-17 DIAGNOSIS — J30.1 SEASONAL ALLERGIC RHINITIS DUE TO POLLEN: Primary | ICD-10-CM

## 2024-06-17 PROCEDURE — 95120 IMMUNOTHERAPY ONE INJECTION: CPT

## 2024-06-23 ENCOUNTER — HEALTH MAINTENANCE LETTER (OUTPATIENT)
Age: 49
End: 2024-06-23

## 2024-07-07 SDOH — HEALTH STABILITY: PHYSICAL HEALTH: ON AVERAGE, HOW MANY MINUTES DO YOU ENGAGE IN EXERCISE AT THIS LEVEL?: 20 MIN

## 2024-07-07 SDOH — HEALTH STABILITY: PHYSICAL HEALTH: ON AVERAGE, HOW MANY DAYS PER WEEK DO YOU ENGAGE IN MODERATE TO STRENUOUS EXERCISE (LIKE A BRISK WALK)?: 5 DAYS

## 2024-07-07 ASSESSMENT — SOCIAL DETERMINANTS OF HEALTH (SDOH): HOW OFTEN DO YOU GET TOGETHER WITH FRIENDS OR RELATIVES?: TWICE A WEEK

## 2024-07-08 ENCOUNTER — OFFICE VISIT (OUTPATIENT)
Dept: FAMILY MEDICINE | Facility: CLINIC | Age: 49
End: 2024-07-08
Payer: COMMERCIAL

## 2024-07-08 VITALS
HEART RATE: 94 BPM | DIASTOLIC BLOOD PRESSURE: 80 MMHG | HEIGHT: 64 IN | TEMPERATURE: 99.1 F | SYSTOLIC BLOOD PRESSURE: 118 MMHG | BODY MASS INDEX: 38.57 KG/M2 | RESPIRATION RATE: 16 BRPM | WEIGHT: 225.9 LBS | OXYGEN SATURATION: 97 %

## 2024-07-08 DIAGNOSIS — Z12.31 ENCOUNTER FOR SCREENING MAMMOGRAM FOR MALIGNANT NEOPLASM OF BREAST: ICD-10-CM

## 2024-07-08 DIAGNOSIS — Z00.00 ROUTINE PHYSICAL EXAMINATION: Primary | ICD-10-CM

## 2024-07-08 DIAGNOSIS — I10 ESSENTIAL HYPERTENSION: ICD-10-CM

## 2024-07-08 PROCEDURE — 99396 PREV VISIT EST AGE 40-64: CPT | Performed by: NURSE PRACTITIONER

## 2024-07-08 RX ORDER — LISINOPRIL 20 MG/1
20 TABLET ORAL DAILY
Qty: 90 TABLET | Refills: 3 | Status: SHIPPED | OUTPATIENT
Start: 2024-07-08

## 2024-07-08 NOTE — PROGRESS NOTES
"Preventive Care Visit  [facility]  Annette Duran, SHAHZAD, Nurse Practitioner - Family  Jul 8, 2024      Assessment & Plan     Routine physical examination  - REVIEW OF HEALTH MAINTENANCE PROTOCOL ORDERS    Encounter for screening mammogram for malignant neoplasm of breast  - MA Diagnostic Digital Bilateral    Essential hypertension  - lisinopril (ZESTRIL) 20 MG tablet  Dispense: 90 tablet; Refill: 3      Requested records from [facility] for colonoscopy and for OBGYN.        BMI  Estimated body mass index is 38.78 kg/m  as calculated from the following:    Height as of this encounter: 1.626 m (5' 4\").    Weight as of this encounter: 102.5 kg (225 lb 14.4 oz).   Weight management plan: Discussed healthy diet and exercise guidelines    Counseling  Appropriate preventive services were discussed with this patient, including applicable screening as appropriate for fall prevention, nutrition, physical activity, Tobacco-use cessation, weight loss and cognition.  Checklist reviewing preventive services available has been given to the patient.  Reviewed patient's diet, addressing concerns and/or questions.   She is at risk for psychosocial distress and has been provided with information to reduce risk.       See Patient Instructions    Subjective   Cassia is a 49 year old, presenting for the following:  Physical (Not fasting - had blood work done 2/24 and pap )        7/8/2024    11:20 AM   Additional Questions   Roomed by Daniella        Via the Health Maintenance questionnaire, the patient has reported the following services have been completed -Colonscopy: [facility] 2022-10-15, this information has been sent to the abstraction team.  Health Care Directive  Patient does not have a Health Care Directive or Living Will: Discussed advance care planning with patient; information given to patient to review.    HPI      OBGYN-Dr. Oneill    Naturopath-Dr. Stubbs-did some labs in Feb and were normal. Using " compounded ozempic just started 2 weeks ago.     Sees allergist-doing shots and helpful so far.     Hypertension Follow-up    Do you check your blood pressure regularly outside of the clinic? Yes   Are you following a low salt diet? Yes  Are your blood pressures ever more than 140 on the top number (systolic) OR more   than 90 on the bottom number (diastolic), for example 140/90? No        7/7/2024   General Health   How would you rate your overall physical health? (!) FAIR   Feel stress (tense, anxious, or unable to sleep) Only a little      (!) STRESS CONCERN      7/7/2024   Nutrition   Three or more servings of calcium each day? Yes   Diet: Gluten-free/reduced   How many servings of fruit and vegetables per day? 4 or more   How many sweetened beverages each day? 0-1            7/7/2024   Exercise   Days per week of moderate/strenous exercise 5 days   Average minutes spent exercising at this level 20 min            7/7/2024   Social Factors   Frequency of gathering with friends or relatives Twice a week   Worry food won't last until get money to buy more No   Food not last or not have enough money for food? No   Do you have housing? (Housing is defined as stable permanent housing and does not include staying ouside in a car, in a tent, in an abandoned building, in an overnight shelter, or couch-surfing.) Yes   Are you worried about losing your housing? No   Lack of transportation? No   Unable to get utilities (heat,electricity)? No            7/7/2024   Dental   Dentist two times every year? Yes            7/7/2024   TB Screening   Were you born outside of the [place]? No            Today's PHQ-2 Score:       7/7/2024     5:01 PM   PHQ-2 ( 1999 [employer])   Q1: Little interest or pleasure in doing things 0   Q2: Feeling down, depressed or hopeless 0   PHQ-2 Score 0   Q1: Little interest or pleasure in doing things Not at all   Q2: Feeling down, depressed or hopeless Not at all   PHQ-2 Score 0           7/7/2024    Substance Use   Alcohol more than 3/day or more than 7/wk No   Do you use any other substances recreationally? No        Social History     Tobacco Use    Smoking status: Former     Current packs/day: 0.00     Types: Cigarettes     Quit date: 1994     Years since quittin.0    Smokeless tobacco: Never    Tobacco comments:     just social smoker later in high school and early college, never addicted   Substance Use Topics    Alcohol use: Yes     Comment: 0-2 drinks weekly, mostly wine    Drug use: No             2024   Breast Cancer Screening   Family history of breast, colon, or ovarian cancer? Yes          2024   LAST FHS-7 RESULTS   1st degree relative breast or ovarian cancer No   Any relative bilateral breast cancer Unknown   Any male have breast cancer No   Any ONE woman have BOTH breast AND ovarian cancer No   Any woman with breast cancer before 50yrs Yes   2 or more relatives with breast AND/OR ovarian cancer Yes   2 or more relatives with breast AND/OR bowel cancer Yes           Mammogram Screening - Mammogram every 1-2 years updated in Health Maintenance based on mutual decision making        2024   STI Screening   New sexual partner(s) since last STI/HIV test? No        History of abnormal Pap smear: No - age 30-64 HPV with reflex Pap every 5 years recommended        2020    12:00 AM 3/13/2012    12:00 AM   PAP / HPV   PAP-ABSTRACT See Scanned Document     See Scanned Document           This result is from an external source.     ASCVD Risk   The 10-year ASCVD risk score (Zach ANTHONY, et al., 2019) is: 1.5%    Values used to calculate the score:      Age: 49 years      Sex: Female      Is Non- : No      Diabetic: No      Tobacco smoker: No      Systolic Blood Pressure: 118 mmHg      Is BP treated: Yes      HDL Cholesterol: 50 mg/dL      Total Cholesterol: 204 mg/dL        2024   Contraception/Family Planning   Questions about contraception or  "family planning No           Reviewed and updated as needed this visit by Provider                    Past Medical History:   Diagnosis Date    Class 1 obesity due to excess calories without serious comorbidity with body mass index (BMI) of 34.0 to 34.9 in adult 12/10/2018    Hx of major depression     Hypertension     Obesity (BMI 30-39.9) 3/29/2013    SHAILA (obstructive sleep apnea)     CPAP    Seasonal allergies      Past Surgical History:   Procedure Laterality Date    BUNIONECTOMY  2003    bilateral    ORTHOPEDIC SURGERY  2003    bilateral bunionectomy    ZZC ORAL SURGERY PROCEDURE      wisdom teeth         Review of Systems  Constitutional, HEENT, cardiovascular, pulmonary, GI, , musculoskeletal, neuro, skin, endocrine and psych systems are negative, except as otherwise noted.     Objective    Exam  /80   Pulse 94   Temp 99.1  F (37.3  C) (Tympanic)   Resp 16   Ht 1.626 m (5' 4\")   Wt 102.5 kg (225 lb 14.4 oz)   LMP 06/13/2024   SpO2 97%   BMI 38.78 kg/m     Estimated body mass index is 38.78 kg/m  as calculated from the following:    Height as of this encounter: 1.626 m (5' 4\").    Weight as of this encounter: 102.5 kg (225 lb 14.4 oz).    Physical Exam  GENERAL: alert and no distress  EYES: Eyes grossly normal to inspection, PERRL and conjunctivae and sclerae normal  HENT: ear canals and TM's normal, nose and mouth without ulcers or lesions  NECK: no adenopathy, no asymmetry, masses, or scars  RESP: lungs clear to auscultation - no rales, rhonchi or wheezes  CV: regular rate and rhythm, normal S1 S2, no S3 or S4, no murmur, click or rub, no peripheral edema  ABDOMEN: soft, nontender, no hepatosplenomegaly, no masses and bowel sounds normal  MS: no gross musculoskeletal defects noted, no edema  SKIN: no suspicious lesions or rashes  NEURO: Normal strength and tone, mentation intact and speech normal  PSYCH: mentation appears normal, affect normal/bright        Signed Electronically by: Annette CONDON" Renee, CNP

## 2024-07-16 ENCOUNTER — ALLIED HEALTH/NURSE VISIT (OUTPATIENT)
Dept: ALLERGY | Facility: CLINIC | Age: 49
End: 2024-07-16
Payer: COMMERCIAL

## 2024-07-16 DIAGNOSIS — J30.1 SEASONAL ALLERGIC RHINITIS DUE TO POLLEN: Primary | ICD-10-CM

## 2024-07-16 PROCEDURE — 95120 IMMUNOTHERAPY ONE INJECTION: CPT

## 2024-07-16 NOTE — PROGRESS NOTES
Jacinat Robertson presents to clinic today at the request of Carlyle Jacobson MD (ordering provider) for Allergy Immunotherapy injection(s).       This service provided today was under the care of Carlyle Jacobson MD; the supervising provider of the day; who was available if needed.      Patient presented after waiting 30 minutes with a small cluster of hives around the injection. RN huddled with Dr. Jacobson to verify that patient was okay to discharge. Patient was not experiencing any systemic symptoms and dose use ice at injection site. Patient did take 10mg Claritin am of shots. Dr. Jacobson recommended that patient take an additional dose of Claritin when she gets home. RN advised patient to reach out if symptoms continue. Dr. Jacobson does want patient to take 10mg Claritin hs prior and am of next injection. Patient was made aware of this. [employer] message will also be sent with this recommendation for patient. Discharged from clinic.     Mary Polanco RN

## 2024-07-22 ENCOUNTER — ALLIED HEALTH/NURSE VISIT (OUTPATIENT)
Dept: ALLERGY | Facility: CLINIC | Age: 49
End: 2024-07-22
Payer: COMMERCIAL

## 2024-07-22 DIAGNOSIS — J30.1 SEASONAL ALLERGIC RHINITIS DUE TO POLLEN: Primary | ICD-10-CM

## 2024-07-22 PROCEDURE — 95120 IMMUNOTHERAPY ONE INJECTION: CPT

## 2024-08-02 ENCOUNTER — TELEPHONE (OUTPATIENT)
Dept: ALLERGY | Facility: CLINIC | Age: 49
End: 2024-08-02
Payer: COMMERCIAL

## 2024-08-02 NOTE — TELEPHONE ENCOUNTER
Called and spoke with patient after she cancelled her appointment on Monday due to being COVID positive. Patient only had a low-grade fever for one day and has not had to use an antipyretic medication. Patient denies shortness of breath and wheezing. Notes some cough and congestion.

## 2024-08-02 NOTE — TELEPHONE ENCOUNTER
Spoke with Dr. Jacobson and patient is okay to come for her injection on Monday. When I orginally spoke with patient, I told her I would call her back if we needed to cancel. No further action needed.     Mary Polanco, BSN, RN

## 2024-08-02 NOTE — TELEPHONE ENCOUNTER
[facility] Call Center    Phone Message    May a detailed message be left on voicemail: yes     Reason for Call: Other: Pt called and said that she tested positive for COVID on Tuesday and had symptoms on Sunday. Pt said she had no fevers Monday evening. Pt was wondering if she should r/s her 7/29 allergy shot or should she just wait until her 8/20 appt. If pt can r/s should she wait until after the 10 days or have a negative test? Or can she come with a mask on. Please call her back to discuss. Thanks      Action Taken: Te sent    Travel Screening: Not Applicable     Date of Service:

## 2024-08-05 ENCOUNTER — ALLIED HEALTH/NURSE VISIT (OUTPATIENT)
Dept: ALLERGY | Facility: CLINIC | Age: 49
End: 2024-08-05
Payer: COMMERCIAL

## 2024-08-05 DIAGNOSIS — J30.1 SEASONAL ALLERGIC RHINITIS DUE TO POLLEN: Primary | ICD-10-CM

## 2024-08-05 PROCEDURE — 95120 IMMUNOTHERAPY ONE INJECTION: CPT

## 2024-08-05 NOTE — PROGRESS NOTES
Jacinta Robertson presents to clinic today at the request of Carlyle Jacobson MD (ordering provider) for Allergy Immunotherapy injection(s).       This service provided today was under the care of Carlyle Jacobson MD; the supervising provider of the day; who was available if needed.      Patient presented after waiting 30 minutes with no reaction to  injections. Discharged from clinic.    Teresa Miguel RN

## 2024-08-13 ENCOUNTER — HOSPITAL ENCOUNTER (OUTPATIENT)
Dept: MAMMOGRAPHY | Facility: CLINIC | Age: 49
Discharge: HOME | End: 2024-08-13
Attending: NURSE PRACTITIONER | Admitting: NURSE PRACTITIONER
Payer: COMMERCIAL

## 2024-08-13 DIAGNOSIS — Z12.31 ENCOUNTER FOR SCREENING MAMMOGRAM FOR MALIGNANT NEOPLASM OF BREAST: ICD-10-CM

## 2024-08-13 PROCEDURE — 77067 SCR MAMMO BI INCL CAD: CPT

## 2024-08-20 ENCOUNTER — OFFICE VISIT (OUTPATIENT)
Dept: ALLERGY | Facility: CLINIC | Age: 49
End: 2024-08-20
Attending: INTERNAL MEDICINE
Payer: COMMERCIAL

## 2024-08-20 ENCOUNTER — ALLIED HEALTH/NURSE VISIT (OUTPATIENT)
Dept: ALLERGY | Facility: CLINIC | Age: 49
End: 2024-08-20
Payer: COMMERCIAL

## 2024-08-20 VITALS
WEIGHT: 221 LBS | HEART RATE: 53 BPM | BODY MASS INDEX: 37.93 KG/M2 | DIASTOLIC BLOOD PRESSURE: 88 MMHG | OXYGEN SATURATION: 97 % | SYSTOLIC BLOOD PRESSURE: 127 MMHG

## 2024-08-20 DIAGNOSIS — L30.9 ECZEMA, UNSPECIFIED TYPE: ICD-10-CM

## 2024-08-20 DIAGNOSIS — R09.81 NASAL CONGESTION: ICD-10-CM

## 2024-08-20 DIAGNOSIS — J30.1 SEASONAL ALLERGIC RHINITIS DUE TO POLLEN: ICD-10-CM

## 2024-08-20 DIAGNOSIS — L30.9 ECZEMA, UNSPECIFIED TYPE: Primary | ICD-10-CM

## 2024-08-20 PROCEDURE — 95120 IMMUNOTHERAPY ONE INJECTION: CPT

## 2024-08-20 PROCEDURE — 99213 OFFICE O/P EST LOW 20 MIN: CPT | Mod: 25 | Performed by: INTERNAL MEDICINE

## 2024-08-20 NOTE — PROGRESS NOTES
Jacinta Robertson was seen in the Allergy Clinic at [facility].    Jacinta Robertson is a 49 year old female being seen today for ongoing evaluation of allergic rhinitis with history of eczema.    Since the last visit the patient has a lot of improvement with allergy injections.    She started allergy shots 2023.  She received the first full dose 2024.  She feels that the allergy shots have provided 80% improvement.  She is getting allergy shots to trees and weeds.  She does continue to use Claritin at night and Nasacort in the morning.  She restarted these medications in August.  She also uses the [place] pot as needed.  She takes a morning Claritin on the days of allergy shots.  She did not have hives after 1 injection in July.  Overall she is tolerating the shots well.    She no longer requires a mask to walk outside in the fall.  She is not requiring twice daily Claritin on a regular basis or eyedrops multiple times per day.    She does have some increased eczema when she has to wash her hands more frequently.  No longer using topical steroids.    Past Medical History:   Diagnosis Date    Class 1 obesity due to excess calories without serious comorbidity with body mass index (BMI) of 34.0 to 34.9 in adult 12/10/2018    Hx of major depression     Hypertension     Obesity (BMI 30-39.9) 3/29/2013    SHAILA (obstructive sleep apnea)     CPAP    Seasonal allergies      Family History   Problem Relation Age of Onset    Hypertension Mother          age 71    C.A.D. Mother     Hypertension Father     Lipids Father     Hyperlipidemia Father     Breast Cancer Paternal Aunt         Cousin as well    Hypertension Maternal Half-Sister     Breast Cancer Cousin      Past Surgical History:   Procedure Laterality Date    BUNIONECTOMY  2003    bilateral    ORTHOPEDIC SURGERY      bilateral bunionectomy    [facility] ORAL SURGERY PROCEDURE      wisdom teeth         Current Outpatient  Medications:     Cholecalciferol (VITAMIN D-3 PO), Take 10,000 Units by mouth, Disp: , Rfl:     Digestive Enzyme CAPS, , Disp:  , Rfl:     EPINEPHrine (ANY BX GENERIC EQUIV) 0.3 MG/0.3ML injection 2-pack, Inject 0.3 mLs (0.3 mg) into the muscle as needed for anaphylaxis (for allergy immunotherapy patients per [place] policy) May repeat one time in 5-15 minutes if response to initial dose is inadequate., Disp: 2 each, Rfl: 1    lisinopril (ZESTRIL) 20 MG tablet, Take 1 tablet (20 mg) by mouth daily, Disp: 90 tablet, Rfl: 3    loratadine (CLARITIN) 10 MG tablet, Take 10 mg by mouth, Disp: , Rfl:     MAGNESIUM GLYCINATE PO, Take 100 mg by mouth daily, Disp: , Rfl:     multivitamin (ONE-DAILY) tablet, Take 1 tablet by mouth daily, Disp:  , Rfl:     Omega-3 Fatty Acids (FISH OIL) 1200 MG capsule, Take 1,200 mg by mouth daily DHA + EPA, Disp: , Rfl:     ORDER FOR ALLERGEN IMMUNOTHERAPY, Name of Mix: Mix #1  Tree , Weeds Birch Mix PRW 1:20 w/v, HS  0.5 ml Boxelder-Maple Mix BHR (Boxelder Hard Red) 1:20 w/v, HS  0.5 ml [place], Common 1:20 w/v, HS  0.5 ml Hackberry 1:20 w/v, HS 0.5 ml Oak Mix RVW 1:20 w/v, HS 0.5 ml Marshelder-Povertyweed 1:20 w/v, HS 0.5 ml Ragweed, Mix (Giant, Short) 1:20 w/v, HS 0.5 ml Sorrel, Sheep 1:20 w/v, HS 0.5 ml  Diluent: HSA 1.0 ml to 5ml, Disp: , Rfl:     Probiotic Product (PROBIOTIC DAILY PO), , Disp: , Rfl:     progesterone (PROMETRIUM) 100 MG capsule, Take 300 mg by mouth daily, Disp: , Rfl:     Sharps Container (SHARPS-A-GATOR LOCKING BRACKET) MISC, As directed. CPAP, heated humidifier, mask, headgear, filters and tubing. For home use. Pressure: **   Length of Need: **, Disp: , Rfl:     UNABLE TO FIND, MEDICATION NAME: OTC allergy eye drop, Disp: , Rfl:   No Known Allergies      EXAM:   /88   Pulse 53   Wt 100.2 kg (221 lb)   LMP 06/13/2024   SpO2 97%   BMI 37.93 kg/m      Constitutional:       General: She is not in acute distress.     Appearance: Normal appearance. She is  not ill-appearing.   HENT:      Head: Normocephalic and atraumatic.      Nose: Nose normal. No congestion or rhinorrhea.      Mouth/Throat:      Mouth: Mucous membranes are moist.      Pharynx: Oropharynx is clear. No posterior oropharyngeal erythema.   Eyes:      General:         Right eye: No discharge.         Left eye: No discharge.   Cardiovascular:      Rate and Rhythm: Normal rate and regular rhythm.      Heart sounds: Normal heart sounds.   Pulmonary:      Effort: Pulmonary effort is normal.      Breath sounds: Normal breath sounds. No wheezing or rhonchi.   Skin:     General: Skin is warm.      Findings: No erythema or rash.   Neurological:      General: No focal deficit present.      Mental Status: She is alert. Mental status is at baseline.   Psychiatric:         Mood and Affect: Mood normal.         Behavior: Behavior normal.        ASSESSMENT/PLAN:  Jacinta Robertson is a 49 year old female seen today for ongoing evaluation of allergic rhinitis as well as eczema.  She is receiving allergy immunotherapy and tolerating this well.  She is having excellent improvement.    She is receiving clinical benefit from the allergy immunotherapy.  She will continue with the Claritin and Nasacort when needed.  Pataday as needed.  She is in agreement with continuation of the allergy immunotherapy.  She has an EpiPen  She received an allergy shot today.    Follow-up in 6 months      Thank you for allowing me to participate in the care of Jacinta Robertson.      I spent 25 minutes on the date of the encounter doing chart review, history and exam, documentation and further coordination as noted above exclusive of separately reported interpretations    Carlyle Jacobson MD  Allergy/Immunology  [facility]

## 2024-08-20 NOTE — LETTER
2024      Jacinta Robertson  [address] [address]      Dear Colleague,    Thank you for referring your patient, Jacinta Robertson, to the [facility] [place]. Please see a copy of my visit note below.    Jacinta Robertson was seen in the Allergy Clinic at [facility].    Jacinta Robertson is a 49 year old female being seen today for ongoing evaluation of allergic rhinitis with history of eczema.    Since the last visit the patient has a lot of improvement with allergy injections.    She started allergy shots 2023.  She received the first full dose 2024.  She feels that the allergy shots have provided 80% improvement.  She is getting allergy shots to trees and weeds.  She does continue to use Claritin at night and Nasacort in the morning.  She restarted these medications in August.  She also uses the [place] pot as needed.  She takes a morning Claritin on the days of allergy shots.  She did not have hives after 1 injection in July.  Overall she is tolerating the shots well.    She no longer requires a mask to walk outside in the fall.  She is not requiring twice daily Claritin on a regular basis or eyedrops multiple times per day.    She does have some increased eczema when she has to wash her hands more frequently.  No longer using topical steroids.    Past Medical History:   Diagnosis Date     Class 1 obesity due to excess calories without serious comorbidity with body mass index (BMI) of 34.0 to 34.9 in adult 12/10/2018     Hx of major depression      Hypertension      Obesity (BMI 30-39.9) 3/29/2013     SHAILA (obstructive sleep apnea)     CPAP     Seasonal allergies      Family History   Problem Relation Age of Onset     Hypertension Mother          age 71     C.A.D. Mother      Hypertension Father      Lipids Father      Hyperlipidemia Father      Breast Cancer Paternal Aunt         Cousin as well     Hypertension Maternal  Half-Sister      Breast Cancer Cousin      Past Surgical History:   Procedure Laterality Date     BUNIONECTOMY  2003    bilateral     ORTHOPEDIC SURGERY  2003    bilateral bunionectomy     [facility] ORAL SURGERY PROCEDURE      wisdom teeth         Current Outpatient Medications:      Cholecalciferol (VITAMIN D-3 PO), Take 10,000 Units by mouth, Disp: , Rfl:      Digestive Enzyme CAPS, , Disp:  , Rfl:      EPINEPHrine (ANY BX GENERIC EQUIV) 0.3 MG/0.3ML injection 2-pack, Inject 0.3 mLs (0.3 mg) into the muscle as needed for anaphylaxis (for allergy immunotherapy patients per [place] policy) May repeat one time in 5-15 minutes if response to initial dose is inadequate., Disp: 2 each, Rfl: 1     lisinopril (ZESTRIL) 20 MG tablet, Take 1 tablet (20 mg) by mouth daily, Disp: 90 tablet, Rfl: 3     loratadine (CLARITIN) 10 MG tablet, Take 10 mg by mouth, Disp: , Rfl:      MAGNESIUM GLYCINATE PO, Take 100 mg by mouth daily, Disp: , Rfl:      multivitamin (ONE-DAILY) tablet, Take 1 tablet by mouth daily, Disp:  , Rfl:      Omega-3 Fatty Acids (FISH OIL) 1200 MG capsule, Take 1,200 mg by mouth daily DHA + EPA, Disp: , Rfl:      ORDER FOR ALLERGEN IMMUNOTHERAPY, Name of Mix: Mix #1  Tree , Weeds Birch Mix PRW 1:20 w/v, HS  0.5 ml Boxelder-Maple Mix BHR (Boxelder Hard Red) 1:20 w/v, HS  0.5 ml [place], Common 1:20 w/v, HS  0.5 ml Hackberry 1:20 w/v, HS 0.5 ml Oak Mix RVW 1:20 w/v, HS 0.5 ml Marshelder-Povertyweed 1:20 w/v, HS 0.5 ml Ragweed, Mix (Giant, Short) 1:20 w/v, HS 0.5 ml Sorrel, Sheep 1:20 w/v, HS 0.5 ml  Diluent: HSA 1.0 ml to 5ml, Disp: , Rfl:      Probiotic Product (PROBIOTIC DAILY PO), , Disp: , Rfl:      progesterone (PROMETRIUM) 100 MG capsule, Take 300 mg by mouth daily, Disp: , Rfl:      Sharps Container (SHARPS-A-GATOR LOCKING BRACKET) MISC, As directed. CPAP, heated humidifier, mask, headgear, filters and tubing. For home use. Pressure: **   Length of Need: **, Disp: , Rfl:      UNABLE TO FIND, MEDICATION NAME:  OTC allergy eye drop, Disp: , Rfl:   No Known Allergies      EXAM:   /88   Pulse 53   Wt 100.2 kg (221 lb)   LMP 06/13/2024   SpO2 97%   BMI 37.93 kg/m      Constitutional:       General: She is not in acute distress.     Appearance: Normal appearance. She is not ill-appearing.   HENT:      Head: Normocephalic and atraumatic.      Nose: Nose normal. No congestion or rhinorrhea.      Mouth/Throat:      Mouth: Mucous membranes are moist.      Pharynx: Oropharynx is clear. No posterior oropharyngeal erythema.   Eyes:      General:         Right eye: No discharge.         Left eye: No discharge.   Cardiovascular:      Rate and Rhythm: Normal rate and regular rhythm.      Heart sounds: Normal heart sounds.   Pulmonary:      Effort: Pulmonary effort is normal.      Breath sounds: Normal breath sounds. No wheezing or rhonchi.   Skin:     General: Skin is warm.      Findings: No erythema or rash.   Neurological:      General: No focal deficit present.      Mental Status: She is alert. Mental status is at baseline.   Psychiatric:         Mood and Affect: Mood normal.         Behavior: Behavior normal.        ASSESSMENT/PLAN:  Jacinta Robertson is a 49 year old female seen today for ongoing evaluation of allergic rhinitis as well as eczema.  She is receiving allergy immunotherapy and tolerating this well.  She is having excellent improvement.    She is receiving clinical benefit from the allergy immunotherapy.  She will continue with the Claritin and Nasacort when needed.  Pataday as needed.  She is in agreement with continuation of the allergy immunotherapy.  She has an EpiPen  She received an allergy shot today.    Follow-up in 6 months      Thank you for allowing me to participate in the care of Jacinta Robertson.      I spent 25 minutes on the date of the encounter doing chart review, history and exam, documentation and further coordination as noted above exclusive of separately reported interpretations    Carlyle  MD Kavon  Allergy/Immunology  [facility]      Again, thank you for allowing me to participate in the care of your patient.        Sincerely,        Carlyle Jacobson MD

## 2024-09-16 ENCOUNTER — ALLIED HEALTH/NURSE VISIT (OUTPATIENT)
Dept: ALLERGY | Facility: CLINIC | Age: 49
End: 2024-09-16
Payer: COMMERCIAL

## 2024-09-16 DIAGNOSIS — J30.1 SEASONAL ALLERGIC RHINITIS DUE TO POLLEN: Primary | ICD-10-CM

## 2024-09-16 PROCEDURE — 95120 IMMUNOTHERAPY ONE INJECTION: CPT

## 2024-10-14 ENCOUNTER — ALLIED HEALTH/NURSE VISIT (OUTPATIENT)
Dept: ALLERGY | Facility: CLINIC | Age: 49
End: 2024-10-14
Payer: COMMERCIAL

## 2024-10-14 DIAGNOSIS — J30.1 SEASONAL ALLERGIC RHINITIS DUE TO POLLEN: Primary | ICD-10-CM

## 2024-10-14 PROCEDURE — 95120 IMMUNOTHERAPY ONE INJECTION: CPT

## 2024-11-15 ENCOUNTER — ALLIED HEALTH/NURSE VISIT (OUTPATIENT)
Dept: ALLERGY | Facility: CLINIC | Age: 49
End: 2024-11-15
Payer: COMMERCIAL

## 2024-11-15 DIAGNOSIS — J30.1 SEASONAL ALLERGIC RHINITIS DUE TO POLLEN: Primary | ICD-10-CM

## 2024-12-16 ENCOUNTER — ALLIED HEALTH/NURSE VISIT (OUTPATIENT)
Dept: ALLERGY | Facility: CLINIC | Age: 49
End: 2024-12-16
Payer: COMMERCIAL

## 2024-12-16 DIAGNOSIS — J30.1 SEASONAL ALLERGIC RHINITIS DUE TO POLLEN: Primary | ICD-10-CM

## 2024-12-16 PROCEDURE — 95120 IMMUNOTHERAPY ONE INJECTION: CPT

## 2025-01-20 ENCOUNTER — ALLIED HEALTH/NURSE VISIT (OUTPATIENT)
Dept: ALLERGY | Facility: CLINIC | Age: 50
End: 2025-01-20
Payer: COMMERCIAL

## 2025-01-20 DIAGNOSIS — J30.1 SEASONAL ALLERGIC RHINITIS DUE TO POLLEN: Primary | ICD-10-CM

## 2025-01-20 PROCEDURE — 95120 IMMUNOTHERAPY ONE INJECTION: CPT

## 2025-01-21 DIAGNOSIS — J30.1 SEASONAL ALLERGIC RHINITIS DUE TO POLLEN: ICD-10-CM

## 2025-01-21 NOTE — TELEPHONE ENCOUNTER
ALLERGY SOLUTION RE-ORDER REQUEST    Jacinta Robertson 1975 MRN: [id number]    DATE NEEDED:  02/15/2025  Vial Color Content    Vial Size  Red 1:1 Trees, Weeds    5 mL      Serum reorder consent signed and patient/parent was advised that new serums would be ordered through the pharmacy and billed to their insurance company when they arrive in clinic. Yes    Shot Clinic Location:  [facility] [place].  Ship to Location: [facility].  Serum billed to:  [facility].    Special Instructions:  N/A        Requester Signature  Teresa Miguel RN

## 2025-01-23 DIAGNOSIS — J30.1 SEASONAL ALLERGIC RHINITIS DUE TO POLLEN: Primary | ICD-10-CM

## 2025-01-23 NOTE — PROGRESS NOTES
Allergy serums billed to [facility].     Vials billed below:    Vial Color Content                      Vial Size Expiration Date  Red 1:1 Trees, Weeds 5mL  1/23/26      Billed 10 units    Checked by Puma Lim / LPN        Signature  Puma Lim LPN

## 2025-02-18 ENCOUNTER — OFFICE VISIT (OUTPATIENT)
Dept: ALLERGY | Facility: CLINIC | Age: 50
End: 2025-02-18
Attending: INTERNAL MEDICINE
Payer: COMMERCIAL

## 2025-02-18 ENCOUNTER — ALLIED HEALTH/NURSE VISIT (OUTPATIENT)
Dept: ALLERGY | Facility: CLINIC | Age: 50
End: 2025-02-18
Payer: COMMERCIAL

## 2025-02-18 VITALS
OXYGEN SATURATION: 97 % | DIASTOLIC BLOOD PRESSURE: 73 MMHG | BODY MASS INDEX: 35.53 KG/M2 | SYSTOLIC BLOOD PRESSURE: 106 MMHG | WEIGHT: 207 LBS | HEART RATE: 83 BPM

## 2025-02-18 DIAGNOSIS — J30.1 SEASONAL ALLERGIC RHINITIS DUE TO POLLEN: ICD-10-CM

## 2025-02-18 DIAGNOSIS — R09.81 NASAL CONGESTION: ICD-10-CM

## 2025-02-18 DIAGNOSIS — J30.1 SEASONAL ALLERGIC RHINITIS DUE TO POLLEN: Primary | ICD-10-CM

## 2025-02-18 DIAGNOSIS — L30.9 ECZEMA, UNSPECIFIED TYPE: ICD-10-CM

## 2025-02-18 NOTE — PROGRESS NOTES
Jacinta Robertson was seen in the Allergy Clinic at [facility].    Jacinta Robertson is a 49 year old female being seen today for ongoing evaluation of allergic rhinitis with history of eczema and also receiving immunotherapy.    Since the last visit the patient has been feeling good.  At the last appointment she felt she was 80% improved with allergy shots.  As she mentioned previously she does not require a mask when outside in the fall.  Her use of the Claritin has reduce from twice daily to once daily in the fall and did not require the frequent eyedrops or Nasacort.  Thus she is quite happy with the allergy shots.    She will take Claritin night before allergy shots and the day of allergy shots with great results.  She did get some mild hives at the injection site after 1 injection.    Her hand eczema is well-controlled with out topical steroids.    PAST ALLERGY HISTORY:    She started allergy shots 2023.  She received the first full dose 2024.  She feels that the allergy shots have provided 80% improvement.  She is getting allergy shots to trees and weeds.      She no longer requires a mask to walk outside in the fall.  She is not requiring twice daily Claritin on a regular basis or eyedrops multiple times per day.    She does have some increased eczema when she has to wash her hands more frequently.  No longer using topical steroids.    Past Medical History:   Diagnosis Date    Class 1 obesity due to excess calories without serious comorbidity with body mass index (BMI) of 34.0 to 34.9 in adult 12/10/2018    Hx of major depression     Hypertension     Obesity (BMI 30-39.9) 3/29/2013    SHAILA (obstructive sleep apnea)     CPAP    Seasonal allergies      Family History   Problem Relation Age of Onset    Hypertension Mother          age 71    C.A.D. Mother     Hypertension Father     Lipids Father     Hyperlipidemia Father     Breast Cancer Paternal Aunt         Cousin  as well    Hypertension Maternal Half-Sister     Breast Cancer Cousin      Past Surgical History:   Procedure Laterality Date    BUNIONECTOMY  2003    bilateral    ORTHOPEDIC SURGERY  2003    bilateral bunionectomy    [facility] ORAL SURGERY PROCEDURE      wisdom teeth         Current Outpatient Medications:     Cholecalciferol (VITAMIN D-3 PO), Take 10,000 Units by mouth, Disp: , Rfl:     Digestive Enzyme CAPS, , Disp:  , Rfl:     EPINEPHrine (ANY BX GENERIC EQUIV) 0.3 MG/0.3ML injection 2-pack, Inject 0.3 mLs (0.3 mg) into the muscle as needed for anaphylaxis (for allergy immunotherapy patients per [place] policy) May repeat one time in 5-15 minutes if response to initial dose is inadequate., Disp: 2 each, Rfl: 1    lisinopril (ZESTRIL) 20 MG tablet, Take 1 tablet (20 mg) by mouth daily, Disp: 90 tablet, Rfl: 3    loratadine (CLARITIN) 10 MG tablet, Take 10 mg by mouth, Disp: , Rfl:     MAGNESIUM GLYCINATE PO, Take 100 mg by mouth daily, Disp: , Rfl:     multivitamin (ONE-DAILY) tablet, Take 1 tablet by mouth daily, Disp:  , Rfl:     Omega-3 Fatty Acids (FISH OIL) 1200 MG capsule, Take 1,200 mg by mouth daily DHA + EPA, Disp: , Rfl:     ORDER FOR ALLERGEN IMMUNOTHERAPY, Name of Mix: Mix #1  Tree , Weeds Birch Mix PRW 1:20 w/v, HS  0.5 ml Boxelder-Maple Mix BHR (Boxelder Hard Red) 1:20 w/v, HS  0.5 ml [place], Common 1:20 w/v, HS  0.5 ml Hackberry 1:20 w/v, HS 0.5 ml Oak Mix RVW 1:20 w/v, HS 0.5 ml Marshelder-Povertyweed 1:20 w/v, HS 0.5 ml Ragweed, Mix (Giant, Short) 1:20 w/v, HS 0.5 ml Sorrel, Sheep 1:20 w/v, HS 0.5 ml  Diluent: HSA 1.0 ml to 5ml, Disp: , Rfl:     Probiotic Product (PROBIOTIC DAILY PO), , Disp: , Rfl:     progesterone (PROMETRIUM) 100 MG capsule, Take 300 mg by mouth daily, Disp: , Rfl:     Sharps Container (SHARPS-A-GATOR LOCKING BRACKET) MISC, As directed. CPAP, heated humidifier, mask, headgear, filters and tubing. For home use. Pressure: **   Length of Need: **, Disp: , Rfl:     UNABLE TO FIND,  MEDICATION NAME: OTC allergy eye drop (Patient not taking: Reported on 2/18/2025), Disp: , Rfl:   No Known Allergies      EXAM:   /73   Pulse 83   Wt 93.9 kg (207 lb)   SpO2 97%   BMI 35.53 kg/m      Constitutional:       General: She is not in acute distress.     Appearance: Normal appearance. She is not ill-appearing.   HENT:      Head: Normocephalic and atraumatic.      Nose: Nose normal. No congestion or rhinorrhea.      Mouth/Throat:      Mouth: Mucous membranes are moist.      Pharynx: Oropharynx is clear. No posterior oropharyngeal erythema.   Eyes:      General:         Right eye: No discharge.         Left eye: No discharge.   Cardiovascular:      Rate and Rhythm: Normal rate and regular rhythm.      Heart sounds: Normal heart sounds.   Pulmonary:      Effort: Pulmonary effort is normal.      Breath sounds: Normal breath sounds. No wheezing or rhonchi.   Skin:     General: Skin is warm.      Findings: No erythema or rash.   Neurological:      General: No focal deficit present.      Mental Status: She is alert. Mental status is at baseline.   Psychiatric:         Mood and Affect: Mood normal.         Behavior: Behavior normal.        ASSESSMENT/PLAN:  Jacinta Robertson is a 49 year old female seen today for ongoing evaluation of allergic rhinitis as well as eczema.  She is receiving allergy immunotherapy with great results.    She is receiving clinical benefit from the allergy immunotherapy.  She will continue with the Claritin and Nasacort when needed.  Pataday as needed.  She is in agreement with continuation of the allergy immunotherapy.  She has an EpiPen, she no longer requires an EpiPen with allergy shots that she has done well over the last year with injections.  She received an allergy shot today.    Follow-up in 6 months      Thank you for allowing me to participate in the care of Jacinta Robertson.      I spent 25 minutes on the date of the encounter doing chart review, history and exam,  documentation and further coordination as noted above exclusive of separately reported interpretations    Carlyle Jacobson MD  Allergy/Immunology  [facility]

## 2025-02-18 NOTE — LETTER
2/18/2025      Jacinta Robertson  [address]  [place] [address]      Dear Colleague,    Thank you for referring your patient, Jacinta Robertson, to the [facility] [place]. Please see a copy of my visit note below.    Jacinta Robertson was seen in the Allergy Clinic at [facility].    Jacinta Robertson is a 49 year old female being seen today for ongoing evaluation of allergic rhinitis with history of eczema and also receiving immunotherapy.    Since the last visit the patient has been feeling good.  At the last appointment she felt she was 80% improved with allergy shots.  As she mentioned previously she does not require a mask when outside in the fall.  Her use of the Claritin has reduce from twice daily to once daily in the fall and did not require the frequent eyedrops or Nasacort.  Thus she is quite happy with the allergy shots.    She will take Claritin night before allergy shots and the day of allergy shots with great results.  She did get some mild hives at the injection site after 1 injection.    Her hand eczema is well-controlled with out topical steroids.    PAST ALLERGY HISTORY:    She started allergy shots December 2023.  She received the first full dose April 2024.  She feels that the allergy shots have provided 80% improvement.  She is getting allergy shots to trees and weeds.      She no longer requires a mask to walk outside in the fall.  She is not requiring twice daily Claritin on a regular basis or eyedrops multiple times per day.    She does have some increased eczema when she has to wash her hands more frequently.  No longer using topical steroids.    Past Medical History:   Diagnosis Date     Class 1 obesity due to excess calories without serious comorbidity with body mass index (BMI) of 34.0 to 34.9 in adult 12/10/2018     Hx of major depression      Hypertension      Obesity (BMI 30-39.9) 3/29/2013     SHAILA (obstructive sleep apnea)      CPAP     Seasonal allergies      Family History   Problem Relation Age of Onset     Hypertension Mother          age 71     C.A.D. Mother      Hypertension Father      Lipids Father      Hyperlipidemia Father      Breast Cancer Paternal Aunt         Cousin as well     Hypertension Maternal Half-Sister      Breast Cancer Cousin      Past Surgical History:   Procedure Laterality Date     BUNIONECTOMY      bilateral     ORTHOPEDIC SURGERY      bilateral bunionectomy     [facility] ORAL SURGERY PROCEDURE      wisdom teeth         Current Outpatient Medications:      Cholecalciferol (VITAMIN D-3 PO), Take 10,000 Units by mouth, Disp: , Rfl:      Digestive Enzyme CAPS, , Disp:  , Rfl:      EPINEPHrine (ANY BX GENERIC EQUIV) 0.3 MG/0.3ML injection 2-pack, Inject 0.3 mLs (0.3 mg) into the muscle as needed for anaphylaxis (for allergy immunotherapy patients per [place] policy) May repeat one time in 5-15 minutes if response to initial dose is inadequate., Disp: 2 each, Rfl: 1     lisinopril (ZESTRIL) 20 MG tablet, Take 1 tablet (20 mg) by mouth daily, Disp: 90 tablet, Rfl: 3     loratadine (CLARITIN) 10 MG tablet, Take 10 mg by mouth, Disp: , Rfl:      MAGNESIUM GLYCINATE PO, Take 100 mg by mouth daily, Disp: , Rfl:      multivitamin (ONE-DAILY) tablet, Take 1 tablet by mouth daily, Disp:  , Rfl:      Omega-3 Fatty Acids (FISH OIL) 1200 MG capsule, Take 1,200 mg by mouth daily DHA + EPA, Disp: , Rfl:      ORDER FOR ALLERGEN IMMUNOTHERAPY, Name of Mix: Mix #1  Tree , Weeds Birch Mix PRW 1:20 w/v, HS  0.5 ml Boxelder-Maple Mix BHR (Boxelder Hard Red) 1:20 w/v, HS  0.5 ml [place], Common 1:20 w/v, HS  0.5 ml Hackberry 1:20 w/v, HS 0.5 ml Oak Mix RVW 1:20 w/v, HS 0.5 ml Marshelder-Povertyweed 1:20 w/v, HS 0.5 ml Ragweed, Mix (Giant, Short) 1:20 w/v, HS 0.5 ml Sorrel, Sheep 1:20 w/v, HS 0.5 ml  Diluent: HSA 1.0 ml to 5ml, Disp: , Rfl:      Probiotic Product (PROBIOTIC DAILY PO), , Disp: , Rfl:      progesterone  (PROMETRIUM) 100 MG capsule, Take 300 mg by mouth daily, Disp: , Rfl:      Sharps Container (SHARPS-A-GATOR LOCKING BRACKET) MISC, As directed. CPAP, heated humidifier, mask, headgear, filters and tubing. For home use. Pressure: **   Length of Need: **, Disp: , Rfl:      UNABLE TO FIND, MEDICATION NAME: OTC allergy eye drop (Patient not taking: Reported on 2/18/2025), Disp: , Rfl:   No Known Allergies      EXAM:   /73   Pulse 83   Wt 93.9 kg (207 lb)   SpO2 97%   BMI 35.53 kg/m      Constitutional:       General: She is not in acute distress.     Appearance: Normal appearance. She is not ill-appearing.   HENT:      Head: Normocephalic and atraumatic.      Nose: Nose normal. No congestion or rhinorrhea.      Mouth/Throat:      Mouth: Mucous membranes are moist.      Pharynx: Oropharynx is clear. No posterior oropharyngeal erythema.   Eyes:      General:         Right eye: No discharge.         Left eye: No discharge.   Cardiovascular:      Rate and Rhythm: Normal rate and regular rhythm.      Heart sounds: Normal heart sounds.   Pulmonary:      Effort: Pulmonary effort is normal.      Breath sounds: Normal breath sounds. No wheezing or rhonchi.   Skin:     General: Skin is warm.      Findings: No erythema or rash.   Neurological:      General: No focal deficit present.      Mental Status: She is alert. Mental status is at baseline.   Psychiatric:         Mood and Affect: Mood normal.         Behavior: Behavior normal.        ASSESSMENT/PLAN:  Jacinta Robertson is a 49 year old female seen today for ongoing evaluation of allergic rhinitis as well as eczema.  She is receiving allergy immunotherapy with great results.    She is receiving clinical benefit from the allergy immunotherapy.  She will continue with the Claritin and Nasacort when needed.  Pataday as needed.  She is in agreement with continuation of the allergy immunotherapy.  She has an EpiPen, she no longer requires an EpiPen with allergy shots that  she has done well over the last year with injections.  She received an allergy shot today.    Follow-up in 6 months      Thank you for allowing me to participate in the care of Jacinta Robertson.      I spent 25 minutes on the date of the encounter doing chart review, history and exam, documentation and further coordination as noted above exclusive of separately reported interpretations    Carlyle Jacobson MD  Allergy/Immunology  [facility]      Again, thank you for allowing me to participate in the care of your patient.        Sincerely,        Carlyle Jacobson MD    Electronically signed

## 2025-02-18 NOTE — PROGRESS NOTES
Patient had follow-up with Dr. Jacobson today. Per Dr. Jacobson, patient no longer is required to carry her EpiPen for allergy immunotherapy. Orders only column placed in Flowsheets.     Jacinta Robertson presents to clinic today at the request of Carlyle Jacobson MD (ordering provider) for Allergy Immunotherapy injection(s).       This service provided today was under the care of Carlyle Jacobson MD; the supervising provider of the day; who was available if needed.      Patient presented after waiting 30 minutes with no reaction to injections. Discharged from clinic.    Mary Polanco, MENDOZAN, RN

## 2025-03-18 ENCOUNTER — ALLIED HEALTH/NURSE VISIT (OUTPATIENT)
Dept: ALLERGY | Facility: CLINIC | Age: 50
End: 2025-03-18
Payer: COMMERCIAL

## 2025-03-18 DIAGNOSIS — J30.1 SEASONAL ALLERGIC RHINITIS DUE TO POLLEN: Primary | ICD-10-CM

## 2025-03-18 PROCEDURE — 95115 IMMUNOTHERAPY ONE INJECTION: CPT

## 2025-03-18 NOTE — PROGRESS NOTES
Jacinta Robertson presents to clinic today at the request of Carlyle Jacobson MD (ordering provider) for Allergy Immunotherapy injection(s).       This service provided today was under the care of Carlyle Jacobson MD; the supervising provider of the day; who was available if needed.      Patient presented after waiting 30 minutes with no reaction to injections. Discharged from clinic.    MENDOZA DimasN, RN

## 2025-03-24 ENCOUNTER — ALLIED HEALTH/NURSE VISIT (OUTPATIENT)
Dept: ALLERGY | Facility: CLINIC | Age: 50
End: 2025-03-24
Payer: COMMERCIAL

## 2025-03-24 DIAGNOSIS — J30.1 SEASONAL ALLERGIC RHINITIS DUE TO POLLEN: Primary | ICD-10-CM

## 2025-03-24 PROCEDURE — 95120 IMMUNOTHERAPY ONE INJECTION: CPT

## 2025-03-24 NOTE — PROGRESS NOTES
Jacinta Robertson presents to clinic today at the request of Carlyle Jacobson MD (ordering provider) for Allergy Immunotherapy injection(s).       This service provided today was under the care of Lore Young; the supervising provider of the day; who was available if needed.      Patient presented after waiting 30 minutes with no reaction to  injections. Discharged from clinic.    Teresa Miguel RN

## 2025-04-21 ENCOUNTER — ALLIED HEALTH/NURSE VISIT (OUTPATIENT)
Dept: ALLERGY | Facility: CLINIC | Age: 50
End: 2025-04-21
Payer: COMMERCIAL

## 2025-04-21 DIAGNOSIS — J30.1 SEASONAL ALLERGIC RHINITIS DUE TO POLLEN: Primary | ICD-10-CM

## 2025-04-21 PROCEDURE — 95115 IMMUNOTHERAPY ONE INJECTION: CPT

## 2025-04-21 NOTE — PROGRESS NOTES
Jacinta Robertson presents to clinic today at the request of Carlyle Jacobson MD (ordering provider) for Allergy Immunotherapy injection(s).       This service provided today was under the care of Carlyle Jacobson MD; the supervising provider of the day; who was available if needed.      Patient presented after waiting 30 minutes with no reaction to  injections. Discharged from clinic.    Tereas Miguel RN

## 2025-05-12 ENCOUNTER — TRANSFERRED RECORDS (OUTPATIENT)
Dept: HEALTH INFORMATION MANAGEMENT | Facility: CLINIC | Age: 50
End: 2025-05-12

## 2025-05-12 LAB
ALT SERPL-CCNC: 17 U/L (ref 6–29)
AST SERPL-CCNC: 17 U/L (ref 10–35)
CREATININE (EXTERNAL): 0.76 MG/DL (ref 0.5–1.03)
GFR ESTIMATED (EXTERNAL): 95 ML/MIN/1.73M2
GLUCOSE (EXTERNAL): 85 MG/DL (ref 65–99)
HBA1C MFR BLD: 5.4 %
POTASSIUM (EXTERNAL): 4.6 MMOL/L (ref 3.5–5.3)
TSH SERPL-ACNC: 1.72 MIU/L (ref 0.4–4.5)

## 2025-05-19 ENCOUNTER — ALLIED HEALTH/NURSE VISIT (OUTPATIENT)
Dept: ALLERGY | Facility: CLINIC | Age: 50
End: 2025-05-19
Payer: COMMERCIAL

## 2025-05-19 DIAGNOSIS — J30.1 SEASONAL ALLERGIC RHINITIS DUE TO POLLEN: Primary | ICD-10-CM

## 2025-05-19 PROCEDURE — 95115 IMMUNOTHERAPY ONE INJECTION: CPT

## 2025-06-09 ENCOUNTER — PATIENT OUTREACH (OUTPATIENT)
Dept: CARE COORDINATION | Facility: CLINIC | Age: 50
End: 2025-06-09
Payer: COMMERCIAL

## 2025-06-16 ENCOUNTER — ALLIED HEALTH/NURSE VISIT (OUTPATIENT)
Dept: ALLERGY | Facility: CLINIC | Age: 50
End: 2025-06-16
Payer: COMMERCIAL

## 2025-06-16 DIAGNOSIS — J30.1 SEASONAL ALLERGIC RHINITIS DUE TO POLLEN: Primary | ICD-10-CM

## 2025-06-16 PROCEDURE — 95115 IMMUNOTHERAPY ONE INJECTION: CPT

## 2025-07-14 ENCOUNTER — ALLIED HEALTH/NURSE VISIT (OUTPATIENT)
Dept: ALLERGY | Facility: CLINIC | Age: 50
End: 2025-07-14
Payer: COMMERCIAL

## 2025-07-14 DIAGNOSIS — J30.1 SEASONAL ALLERGIC RHINITIS DUE TO POLLEN: Primary | ICD-10-CM

## 2025-07-14 PROCEDURE — 95115 IMMUNOTHERAPY ONE INJECTION: CPT

## 2025-07-29 SDOH — HEALTH STABILITY: PHYSICAL HEALTH: ON AVERAGE, HOW MANY DAYS PER WEEK DO YOU ENGAGE IN MODERATE TO STRENUOUS EXERCISE (LIKE A BRISK WALK)?: 5 DAYS

## 2025-07-29 SDOH — HEALTH STABILITY: PHYSICAL HEALTH: ON AVERAGE, HOW MANY MINUTES DO YOU ENGAGE IN EXERCISE AT THIS LEVEL?: 40 MIN

## 2025-07-29 ASSESSMENT — SOCIAL DETERMINANTS OF HEALTH (SDOH): HOW OFTEN DO YOU GET TOGETHER WITH FRIENDS OR RELATIVES?: TWICE A WEEK

## 2025-07-31 ENCOUNTER — OFFICE VISIT (OUTPATIENT)
Dept: FAMILY MEDICINE | Facility: CLINIC | Age: 50
End: 2025-07-31
Payer: COMMERCIAL

## 2025-07-31 VITALS
HEIGHT: 64 IN | SYSTOLIC BLOOD PRESSURE: 108 MMHG | BODY MASS INDEX: 32.95 KG/M2 | TEMPERATURE: 98.5 F | WEIGHT: 193 LBS | HEART RATE: 89 BPM | OXYGEN SATURATION: 99 % | RESPIRATION RATE: 18 BRPM | DIASTOLIC BLOOD PRESSURE: 74 MMHG

## 2025-07-31 DIAGNOSIS — I10 ESSENTIAL HYPERTENSION: ICD-10-CM

## 2025-07-31 DIAGNOSIS — Z00.00 ROUTINE PHYSICAL EXAMINATION: Primary | ICD-10-CM

## 2025-07-31 RX ORDER — LISINOPRIL 20 MG/1
20 TABLET ORAL DAILY
Qty: 30 TABLET | Refills: 3 | Status: SHIPPED | OUTPATIENT
Start: 2025-07-31

## 2025-07-31 NOTE — PROGRESS NOTES
"Preventive Care Visit  [facility]  Annette Duran CNP, Nurse Practitioner - Family  Jul 31, 2025      Assessment & Plan     Routine physical examination    Essential hypertension  - lisinopril (ZESTRIL) 20 MG tablet  Dispense: 30 tablet; Refill: 3    Patient has been advised of split billing requirements and indicates understanding: Yes    BMI  Estimated body mass index is 33.13 kg/m  as calculated from the following:    Height as of this encounter: 1.626 m (5' 4\").    Weight as of this encounter: 87.5 kg (193 lb).   Weight management plan: Discussed healthy diet and exercise guidelines    Counseling  Appropriate preventive services were addressed with this patient via screening, questionnaire, or discussion as appropriate for fall prevention, nutrition, physical activity, Tobacco-use cessation, social engagement, weight loss and cognition.  Checklist reviewing preventive services available has been given to the patient.  Reviewed patient's diet, addressing concerns and/or questions.   Reviewed preventive health counseling, as reflected in patient instructions        Subjective   Cassia is a 50 year old, presenting for the following:  Physical        7/31/2025     2:31 PM   Additional Questions   Roomed by Denise KHAN        Via the Health Maintenance questionnaire, the patient has reported the following services have been completed -Cervical Cancer Screening: OGI (Obstetrics, Gynecology & Infertility) Dr. Annette Oneill 2024-06-10, this information has been sent to the abstraction team.    HPI     Taking compounded ozempic 2.4 mg per week. Down about 35 lbs in a year.     Sees GYN for PAP. Up to date. Recently started some HRT through them. Cycles are irregular slightly but still monthly.     Hypertension Follow-up    Do you check your blood pressure regularly outside of the clinic? sometimes   Are you following a low salt diet? Yes  Are your blood pressures ever more than 140 on the top number " (systolic) OR more   than 90 on the bottom number (diastolic), for example 140/90? no    BP Readings from Last 2 Encounters:   25 106/73   24 127/88       Advance Care Planning            2025   General Health   How would you rate your overall physical health? Good   Feel stress (tense, anxious, or unable to sleep) Only a little   (!) STRESS CONCERN      2025   Nutrition   Three or more servings of calcium each day? Yes   Diet: Gluten-free/reduced   How many servings of fruit and vegetables per day? 4 or more   How many sweetened beverages each day? 0-1         2025   Exercise   Days per week of moderate/strenous exercise 5 days   Average minutes spent exercising at this level 40 min         2025   Social Factors   Frequency of gathering with friends or relatives Twice a week   Worry food won't last until get money to buy more No   Food not last or not have enough money for food? No   Do you have housing? (Housing is defined as stable permanent housing and does not include staying outside in a car, in a tent, in an abandoned building, in an overnight shelter, or couch-surfing.) Yes   Are you worried about losing your housing? No   Lack of transportation? No   Unable to get utilities (heat,electricity)? No         2025   Fall Risk   Fallen 2 or more times in the past year? No   Trouble with walking or balance? No          2025   Dental   Dentist two times every year? Yes           Today's PHQ-2 Score:       2025    10:56 AM   PHQ-2 (  [employer])   Q1: Little interest or pleasure in doing things 0   Q2: Feeling down, depressed or hopeless 0   PHQ-2 Score 0         2025   Substance Use   Alcohol more than 3/day or more than 7/wk No   Do you use any other substances recreationally? No     Social History     Tobacco Use    Smoking status: Former     Current packs/day: 0.00     Types: Cigarettes     Quit date: 1991     Years since quittin.1     Passive  exposure: Past    Smokeless tobacco: Never    Tobacco comments:     just social smoker later in high school and early college, never addicted   Substance Use Topics    Alcohol use: Yes     Comment: 0-2 drinks weekly, mostly wine    Drug use: No           8/13/2024   LAST FHS-7 RESULTS   1st degree relative breast or ovarian cancer No   Any relative bilateral breast cancer No   Any male have breast cancer No   Any ONE woman have BOTH breast AND ovarian cancer No   Any woman with breast cancer before 50yrs Yes   2 or more relatives with breast AND/OR ovarian cancer Yes   2 or more relatives with breast AND/OR bowel cancer Yes        Mammogram Screening - Mammogram every 1-2 years updated in Health Maintenance based on mutual decision making        7/29/2025   STI Screening   New sexual partner(s) since last STI/HIV test? No     History of abnormal Pap smear: No - age 30- 64 PAP with HPV every 5 years recommended        2/20/2020    12:00 AM 3/13/2012    12:00 AM   PAP / HPV   PAP-ABSTRACT See Scanned Document  See Scanned Document      ASCVD Risk   The 10-year ASCVD risk score (Zach ANTHONY, et al., 2019) is: 1.7%    Values used to calculate the score:      Age: 50 years      Sex: Female      Is Non- : No      Diabetic: No      Tobacco smoker: No      Systolic Blood Pressure: 106 mmHg      Is BP treated: Yes      HDL Cholesterol: 49 mg/dL      Total Cholesterol: 241 mg/dL            7/29/2025   Contraception/Family Planning   Questions about contraception or family planning No   What are your periods like? Regular        Reviewed and updated as needed this visit by Provider                    Past Medical History:   Diagnosis Date    Class 1 obesity due to excess calories without serious comorbidity with body mass index (BMI) of 34.0 to 34.9 in adult 12/10/2018    Hx of major depression     Hypertension     Obesity (BMI 30-39.9) 3/29/2013    SHAILA (obstructive sleep apnea)     CPAP     "Seasonal allergies      Past Surgical History:   Procedure Laterality Date    BUNIONECTOMY  2003    bilateral    ORTHOPEDIC SURGERY  2003    bilateral bunionectomy    [facility] ORAL SURGERY PROCEDURE      wisdom teeth         Review of Systems  Constitutional, HEENT, cardiovascular, pulmonary, GI, , musculoskeletal, neuro, skin, endocrine and psych systems are negative, except as otherwise noted.     Objective    Exam  There were no vitals taken for this visit.   Estimated body mass index is 35.53 kg/m  as calculated from the following:    Height as of 7/8/24: 1.626 m (5' 4\").    Weight as of 2/18/25: 93.9 kg (207 lb).    Physical Exam  GENERAL: alert and no distress  EYES: Eyes grossly normal to inspection, PERRL and conjunctivae and sclerae normal  HENT: ear canals and TM's normal, nose and mouth without ulcers or lesions  NECK: no adenopathy, no asymmetry, masses, or scars  RESP: lungs clear to auscultation - no rales, rhonchi or wheezes  CV: regular rate and rhythm, normal S1 S2, no S3 or S4, no murmur, click or rub, no peripheral edema  ABDOMEN: soft, nontender, no hepatosplenomegaly, no masses and bowel sounds normal  MS: no gross musculoskeletal defects noted, no edema  SKIN: no suspicious lesions or rashes  NEURO: Normal strength and tone, mentation intact and speech normal  PSYCH: mentation appears normal, affect normal/bright        Signed Electronically by: Annette Duran CNP    "

## 2025-08-15 ENCOUNTER — HOSPITAL ENCOUNTER (OUTPATIENT)
Dept: MAMMOGRAPHY | Facility: CLINIC | Age: 50
Discharge: HOME | End: 2025-08-15
Attending: NURSE PRACTITIONER | Admitting: NURSE PRACTITIONER
Payer: COMMERCIAL

## 2025-08-15 DIAGNOSIS — Z12.31 VISIT FOR SCREENING MAMMOGRAM: ICD-10-CM

## 2025-08-15 PROCEDURE — 77063 BREAST TOMOSYNTHESIS BI: CPT

## 2025-08-18 ENCOUNTER — ALLIED HEALTH/NURSE VISIT (OUTPATIENT)
Dept: ALLERGY | Facility: CLINIC | Age: 50
End: 2025-08-18
Payer: COMMERCIAL

## 2025-08-18 ENCOUNTER — OFFICE VISIT (OUTPATIENT)
Dept: ALLERGY | Facility: CLINIC | Age: 50
End: 2025-08-18
Attending: INTERNAL MEDICINE
Payer: COMMERCIAL

## 2025-08-18 VITALS
HEART RATE: 71 BPM | SYSTOLIC BLOOD PRESSURE: 116 MMHG | WEIGHT: 194.6 LBS | OXYGEN SATURATION: 97 % | DIASTOLIC BLOOD PRESSURE: 78 MMHG | BODY MASS INDEX: 33.4 KG/M2

## 2025-08-18 DIAGNOSIS — R09.81 NASAL CONGESTION: ICD-10-CM

## 2025-08-18 DIAGNOSIS — J30.1 SEASONAL ALLERGIC RHINITIS DUE TO POLLEN: ICD-10-CM

## 2025-08-18 DIAGNOSIS — J30.1 SEASONAL ALLERGIC RHINITIS DUE TO POLLEN: Primary | ICD-10-CM

## 2025-08-18 DIAGNOSIS — L30.9 ECZEMA, UNSPECIFIED TYPE: ICD-10-CM

## 2025-08-18 PROCEDURE — 99213 OFFICE O/P EST LOW 20 MIN: CPT | Mod: 25 | Performed by: INTERNAL MEDICINE

## 2025-08-18 PROCEDURE — 3074F SYST BP LT 130 MM HG: CPT | Performed by: INTERNAL MEDICINE

## 2025-08-18 PROCEDURE — 3078F DIAST BP <80 MM HG: CPT | Performed by: INTERNAL MEDICINE

## 2025-08-18 PROCEDURE — 95115 IMMUNOTHERAPY ONE INJECTION: CPT
